# Patient Record
Sex: MALE | Race: OTHER | NOT HISPANIC OR LATINO | URBAN - METROPOLITAN AREA
[De-identification: names, ages, dates, MRNs, and addresses within clinical notes are randomized per-mention and may not be internally consistent; named-entity substitution may affect disease eponyms.]

---

## 2019-05-09 ENCOUNTER — EMERGENCY (EMERGENCY)
Facility: HOSPITAL | Age: 24
LOS: 0 days | Discharge: HOME | End: 2019-05-09
Attending: EMERGENCY MEDICINE | Admitting: EMERGENCY MEDICINE
Payer: COMMERCIAL

## 2019-05-09 VITALS
RESPIRATION RATE: 16 BRPM | HEIGHT: 69 IN | OXYGEN SATURATION: 98 % | HEART RATE: 84 BPM | DIASTOLIC BLOOD PRESSURE: 80 MMHG | SYSTOLIC BLOOD PRESSURE: 128 MMHG | WEIGHT: 289.91 LBS | TEMPERATURE: 99 F

## 2019-05-09 DIAGNOSIS — N50.82 SCROTAL PAIN: ICD-10-CM

## 2019-05-09 LAB
APPEARANCE UR: CLEAR — SIGNIFICANT CHANGE UP
BILIRUB UR-MCNC: NEGATIVE — SIGNIFICANT CHANGE UP
COLOR SPEC: YELLOW — SIGNIFICANT CHANGE UP
DIFF PNL FLD: ABNORMAL
EPI CELLS # UR: ABNORMAL /HPF
GLUCOSE UR QL: NEGATIVE MG/DL — SIGNIFICANT CHANGE UP
KETONES UR-MCNC: ABNORMAL
LEUKOCYTE ESTERASE UR-ACNC: NEGATIVE — SIGNIFICANT CHANGE UP
NITRITE UR-MCNC: NEGATIVE — SIGNIFICANT CHANGE UP
PH UR: 7 — SIGNIFICANT CHANGE UP (ref 5–8)
PROT UR-MCNC: NEGATIVE MG/DL — SIGNIFICANT CHANGE UP
RBC CASTS # UR COMP ASSIST: NEGATIVE — SIGNIFICANT CHANGE UP
SP GR SPEC: 1.02 — SIGNIFICANT CHANGE UP (ref 1.01–1.03)
UROBILINOGEN FLD QL: 0.2 MG/DL — SIGNIFICANT CHANGE UP (ref 0.2–0.2)
WBC UR QL: NEGATIVE — SIGNIFICANT CHANGE UP

## 2019-05-09 PROCEDURE — 76870 US EXAM SCROTUM: CPT | Mod: 26

## 2019-05-09 PROCEDURE — 99284 EMERGENCY DEPT VISIT MOD MDM: CPT

## 2019-05-09 RX ADMIN — Medication 100 MILLIGRAM(S): at 18:29

## 2019-05-09 NOTE — ED PROVIDER NOTE - NSFOLLOWUPINSTRUCTIONS_ED_ALL_ED_FT
take doxycycline 100 mg 2 times a day, F/u with you  center for culture results. See urologist next week

## 2019-05-09 NOTE — ED PROVIDER NOTE - OBJECTIVE STATEMENT
Patient c/o tender swollen testicles for 1 week, See at  given keflex.. No fever, no trouble urinating,. Had unprotected sex 2 weeks ago worried about STD

## 2019-05-09 NOTE — ED ADULT NURSE NOTE - CHIEF COMPLAINT QUOTE
As per patient: 'I am sexually active and my testicles are swollen." pt. is on antibiotics - Cephalexin

## 2019-05-09 NOTE — ED PROVIDER NOTE - CARE PROVIDER_API CALL
Dina Carolina)  Urology  [address] [place] [address], [address]  [place] [address]  Phone: [phone]  Fax: [phone]  Follow Up Time:

## 2019-05-09 NOTE — ED ADULT TRIAGE NOTE - CHIEF COMPLAINT QUOTE
As per patient: 'I am sexually active and my testicles are swollen." As per patient: 'I am sexually active and my testicles are swollen." pt. is on antibiotics

## 2019-05-10 LAB
C TRACH RRNA SPEC QL NAA+PROBE: SIGNIFICANT CHANGE UP
N GONORRHOEA RRNA SPEC QL NAA+PROBE: SIGNIFICANT CHANGE UP
SPECIMEN SOURCE: SIGNIFICANT CHANGE UP

## 2019-05-11 LAB
CULTURE RESULTS: NO GROWTH — SIGNIFICANT CHANGE UP
SPECIMEN SOURCE: SIGNIFICANT CHANGE UP

## 2020-02-11 ENCOUNTER — APPOINTMENT (OUTPATIENT)
Dept: UROLOGY | Facility: CLINIC | Age: 25
End: 2020-02-11

## 2020-10-09 ENCOUNTER — TRANSCRIPTION ENCOUNTER (OUTPATIENT)
Age: 25
End: 2020-10-09

## 2020-10-22 ENCOUNTER — APPOINTMENT (OUTPATIENT)
Dept: OTOLARYNGOLOGY | Facility: CLINIC | Age: 25
End: 2020-10-22
Payer: COMMERCIAL

## 2020-10-22 PROBLEM — Z00.00 ENCOUNTER FOR PREVENTIVE HEALTH EXAMINATION: Status: ACTIVE | Noted: 2020-10-22

## 2020-10-22 PROCEDURE — 99204 OFFICE O/P NEW MOD 45 MIN: CPT | Mod: 25

## 2020-10-22 PROCEDURE — 31231 NASAL ENDOSCOPY DX: CPT

## 2020-10-22 RX ORDER — LEVOCETIRIZINE DIHYDROCHLORIDE 5 MG/1
5 TABLET ORAL DAILY
Qty: 30 | Refills: 3 | Status: ACTIVE | COMMUNITY
Start: 2020-10-22 | End: 1900-01-01

## 2020-10-22 NOTE — HISTORY OF PRESENT ILLNESS
[de-identified] : Patient presents today with c/o sinusitis. Patient states pressure in his face recently. He uses saline rinses on and off. He admits testing positive for COVID 10/9/20/20. Tested negative on Tuesday for COVID. He admits worsened anosmia since then. He admits taste of smell coming back slowly. Pt has seasonal allergies spring and fran. Pt has facial pressure in the forehead and below the eyes. Pt has congestion Pt has loss of smell.

## 2020-11-12 ENCOUNTER — APPOINTMENT (OUTPATIENT)
Dept: OTOLARYNGOLOGY | Facility: CLINIC | Age: 25
End: 2020-11-12
Payer: COMMERCIAL

## 2020-11-12 VITALS — BODY MASS INDEX: 45.91 KG/M2 | WEIGHT: 310 LBS | HEIGHT: 69 IN

## 2020-11-12 PROCEDURE — 99072 ADDL SUPL MATRL&STAF TM PHE: CPT

## 2020-11-12 PROCEDURE — 99212 OFFICE O/P EST SF 10 MIN: CPT | Mod: 25

## 2020-11-12 PROCEDURE — 31231 NASAL ENDOSCOPY DX: CPT

## 2020-11-12 NOTE — HISTORY OF PRESENT ILLNESS
[FreeTextEntry1] : Patient comes in  today with c/o sinusitis. Took antibiotic, steroid, and nasal spray prescribed at last visit. Feels much improved since last visit. Facial pressure much improved. Feels less congested overall. Denies rhinorrhea. Also has seasonal allergies. \par  [Facial Pain] : no facial pain [Headache] : no headache [Clear Rhinorrhea] : no clear rhinorrhea [Facial Pressure] : no facial pressure [Purulent Rhinorrhea] : no purulent rhinorrhea [Nasal Congestion] : no nasal congestion

## 2020-11-12 NOTE — PHYSICAL EXAM
[Nasal Endoscopy Performed] : nasal endoscopy was performed, see procedure section for findings [Normal] : mucosa is normal [Midline] : trachea located in midline position [de-identified] : edema

## 2020-12-02 ENCOUNTER — APPOINTMENT (OUTPATIENT)
Dept: OTOLARYNGOLOGY | Facility: CLINIC | Age: 25
End: 2020-12-02
Payer: COMMERCIAL

## 2020-12-02 VITALS — TEMPERATURE: 98.1 F

## 2020-12-02 PROCEDURE — 31231 NASAL ENDOSCOPY DX: CPT

## 2020-12-02 PROCEDURE — 99072 ADDL SUPL MATRL&STAF TM PHE: CPT

## 2020-12-02 PROCEDURE — 99213 OFFICE O/P EST LOW 20 MIN: CPT | Mod: 25

## 2020-12-02 NOTE — PROCEDURE
[Recalcitrant Symptoms] : recalcitrant symptoms  [None] : none [Flexible Endoscope] : examined with the flexible endoscope [Congested] : congested [Pale] : pale [Normal] : the paranasal sinuses had no abnormalities

## 2020-12-02 NOTE — HISTORY OF PRESENT ILLNESS
[FreeTextEntry1] : Patient presents today following up Sinusitis. Patient has been taking allergy medication and nasal sprays as prescribed. Has recent ran out of nasal spray medication. He denies an recent rhinorrhea.  Smell is improving but still diminished. Pt is breathing well.

## 2020-12-02 NOTE — PHYSICAL EXAM
[Nasal Endoscopy Performed] : nasal endoscopy was performed, see procedure section for findings [Normal] : mucosa is normal [Midline] : trachea located in midline position [de-identified] : edema

## 2021-02-17 ENCOUNTER — APPOINTMENT (OUTPATIENT)
Dept: OTOLARYNGOLOGY | Facility: CLINIC | Age: 26
End: 2021-02-17
Payer: COMMERCIAL

## 2021-02-17 DIAGNOSIS — R43.9 UNSPECIFIED DISTURBANCES OF SMELL AND TASTE: ICD-10-CM

## 2021-02-17 PROCEDURE — 99072 ADDL SUPL MATRL&STAF TM PHE: CPT

## 2021-02-17 PROCEDURE — 31231 NASAL ENDOSCOPY DX: CPT

## 2021-02-17 PROCEDURE — 99213 OFFICE O/P EST LOW 20 MIN: CPT | Mod: 25

## 2021-02-17 NOTE — HISTORY OF PRESENT ILLNESS
[FreeTextEntry1] : Patient returns today following up Sinusitis . He has seen improvement since last visit. Took medications as prescribed on last visit. Takes levocetirizine daily. Pt has no gone to allergist. Pt has some persistent nasal congestion.

## 2021-02-17 NOTE — PHYSICAL EXAM
[Midline] : trachea located in midline position [Normal] : no rashes [] : septum deviated to the left [de-identified] : edema

## 2021-03-09 ENCOUNTER — OUTPATIENT (OUTPATIENT)
Dept: OUTPATIENT SERVICES | Facility: HOSPITAL | Age: 26
LOS: 1 days | Discharge: HOME | End: 2021-03-09
Payer: COMMERCIAL

## 2021-03-09 ENCOUNTER — RESULT REVIEW (OUTPATIENT)
Age: 26
End: 2021-03-09

## 2021-03-09 DIAGNOSIS — J01.90 ACUTE SINUSITIS, UNSPECIFIED: ICD-10-CM

## 2021-03-09 PROCEDURE — 70486 CT MAXILLOFACIAL W/O DYE: CPT | Mod: 26

## 2021-03-10 ENCOUNTER — APPOINTMENT (OUTPATIENT)
Dept: OTOLARYNGOLOGY | Facility: CLINIC | Age: 26
End: 2021-03-10
Payer: COMMERCIAL

## 2021-03-10 DIAGNOSIS — J01.90 ACUTE SINUSITIS, UNSPECIFIED: ICD-10-CM

## 2021-03-10 PROCEDURE — 31231 NASAL ENDOSCOPY DX: CPT

## 2021-03-10 PROCEDURE — 99213 OFFICE O/P EST LOW 20 MIN: CPT | Mod: 25

## 2021-03-10 PROCEDURE — 99072 ADDL SUPL MATRL&STAF TM PHE: CPT

## 2021-03-10 NOTE — ASSESSMENT
[FreeTextEntry1] : Risks, benefits, and alternatives of nasal ablation and  turbinate reduction were explained including but not limited to bleeding, infection, persistent symptoms, numbness, perforation, change in smell, change in taste, need for additional surgery, etc...\par

## 2021-03-10 NOTE — REASON FOR VISIT
[Subsequent Evaluation] : a subsequent evaluation for [FreeTextEntry2] : allergic rhinitis, hypertrophy of nasal turbinates

## 2021-03-10 NOTE — PROCEDURE
[Recalcitrant Symptoms] : recalcitrant symptoms  [None] : none [Flexible Endoscope] : examined with the flexible endoscope [Congested] : congested [Deviated to the Rt] : deviated to the right [Normal] : the nasopharynx was normal

## 2021-05-06 ENCOUNTER — APPOINTMENT (OUTPATIENT)
Dept: OTOLARYNGOLOGY | Facility: CLINIC | Age: 26
End: 2021-05-06

## 2021-07-21 ENCOUNTER — APPOINTMENT (OUTPATIENT)
Dept: OTOLARYNGOLOGY | Facility: CLINIC | Age: 26
End: 2021-07-21

## 2021-09-09 ENCOUNTER — APPOINTMENT (OUTPATIENT)
Dept: OTOLARYNGOLOGY | Facility: CLINIC | Age: 26
End: 2021-09-09

## 2021-10-20 ENCOUNTER — APPOINTMENT (OUTPATIENT)
Dept: OTOLARYNGOLOGY | Facility: CLINIC | Age: 26
End: 2021-10-20
Payer: COMMERCIAL

## 2021-10-20 DIAGNOSIS — R22.0 LOCALIZED SWELLING, MASS AND LUMP, HEAD: ICD-10-CM

## 2021-10-20 DIAGNOSIS — J34.3 HYPERTROPHY OF NASAL TURBINATES: ICD-10-CM

## 2021-10-20 PROCEDURE — 31231 NASAL ENDOSCOPY DX: CPT

## 2021-10-20 PROCEDURE — 99214 OFFICE O/P EST MOD 30 MIN: CPT | Mod: 25

## 2021-10-20 RX ORDER — PANTOPRAZOLE 40 MG/1
40 TABLET, DELAYED RELEASE ORAL
Qty: 30 | Refills: 3 | Status: ACTIVE | COMMUNITY
Start: 2021-10-20 | End: 1900-01-01

## 2021-10-20 NOTE — ASSESSMENT
[FreeTextEntry1] : Risks, benefits, and alternatives of turbinate reduction were explained including but not limited to bleeding, infection, persistent symptoms, numbness, perforation, change in smell, change in taste, need for additional surgery, etc...\par

## 2021-10-20 NOTE — PHYSICAL EXAM
[Nasal Endoscopy Performed] : nasal endoscopy was performed, see procedure section for findings [Midline] : trachea located in midline position [Normal] : no abnormal secretions [de-identified] : edema

## 2021-10-20 NOTE — HISTORY OF PRESENT ILLNESS
[FreeTextEntry1] : Patient returns today following up on  allergic rhinitis.  He is  congested . Has seen allergist  suggested allergy shots .\par Would like a second opinion.    He is on azelastine/ Flonase   , and takes Allegra  IVANIA .

## 2022-08-15 ENCOUNTER — APPOINTMENT (OUTPATIENT)
Dept: CARDIOLOGY | Facility: CLINIC | Age: 27
End: 2022-08-15

## 2022-08-15 VITALS
WEIGHT: 301 LBS | BODY MASS INDEX: 44.58 KG/M2 | DIASTOLIC BLOOD PRESSURE: 80 MMHG | HEIGHT: 69 IN | SYSTOLIC BLOOD PRESSURE: 120 MMHG

## 2022-08-15 DIAGNOSIS — U07.1 COVID-19: ICD-10-CM

## 2022-08-15 DIAGNOSIS — Z87.891 PERSONAL HISTORY OF NICOTINE DEPENDENCE: ICD-10-CM

## 2022-08-15 DIAGNOSIS — J30.9 ALLERGIC RHINITIS, UNSPECIFIED: ICD-10-CM

## 2022-08-15 DIAGNOSIS — R06.02 SHORTNESS OF BREATH: ICD-10-CM

## 2022-08-15 PROCEDURE — 93000 ELECTROCARDIOGRAM COMPLETE: CPT

## 2022-08-15 PROCEDURE — 99203 OFFICE O/P NEW LOW 30 MIN: CPT | Mod: 25

## 2022-08-15 PROCEDURE — 93306 TTE W/DOPPLER COMPLETE: CPT

## 2022-08-15 RX ORDER — DOXYCYCLINE HYCLATE 100 MG/1
100 CAPSULE ORAL DAILY
Qty: 14 | Refills: 0 | Status: DISCONTINUED | COMMUNITY
Start: 2020-10-22 | End: 2022-08-15

## 2022-08-15 RX ORDER — MONTELUKAST 10 MG/1
10 TABLET, FILM COATED ORAL
Qty: 90 | Refills: 0 | Status: ACTIVE | COMMUNITY
Start: 2022-04-18

## 2022-08-15 RX ORDER — LEVOCETIRIZINE DIHYDROCHLORIDE 5 MG/1
5 TABLET ORAL
Qty: 30 | Refills: 3 | Status: DISCONTINUED | COMMUNITY
Start: 2020-11-12 | End: 2022-08-15

## 2022-08-15 RX ORDER — METHYLPREDNISOLONE 4 MG/1
4 TABLET ORAL
Qty: 1 | Refills: 0 | Status: DISCONTINUED | COMMUNITY
Start: 2020-10-22 | End: 2022-08-15

## 2022-08-15 RX ORDER — AZELASTINE HYDROCHLORIDE AND FLUTICASONE PROPIONATE 137; 50 UG/1; UG/1
137-50 SPRAY, METERED NASAL
Qty: 69 | Refills: 0 | Status: ACTIVE | COMMUNITY
Start: 2022-05-09

## 2022-08-15 RX ORDER — LEVOCETIRIZINE DIHYDROCHLORIDE 5 MG/1
5 TABLET ORAL DAILY
Qty: 30 | Refills: 2 | Status: COMPLETED | COMMUNITY
Start: 2021-02-17 | End: 2022-08-15

## 2022-08-15 NOTE — HISTORY OF PRESENT ILLNESS
[FreeTextEntry1] : Mr. Proctor is a 26yo M with PMHx of allergic rhinitis, COVID-19 who presents to [facility] care. His PMD is Dr. Laurie Rocha. He has been complaining of SOB. He says it occurs at rest and feels like he is not "getting enough air". He will take a deep breath and will start to feel better. He denies associated exertional symptoms or CP, palpitations. He admits to further stress in his life and notices when he is busy his symptoms are better. He has had COVID twice in the last 2 years, both mild without pulmonary symptoms. He is currently dieting and trying to lose weight. He also admits to former vaping, and will only occasionally vape with friends.

## 2022-08-15 NOTE — REVIEW OF SYSTEMS
[SOB] : shortness of breath [Dyspnea on exertion] : not dyspnea during exertion [Chest Discomfort] : no chest discomfort [Orthopnea] : no orthopnea [Negative] : Heme/Lymph

## 2022-08-15 NOTE — REASON FOR VISIT
[Other: ____] : [unfilled] [FreeTextEntry1] : Diagnostic Tests:\par --------------------\par EKG:\par 08/15/22-NSR. \par --------------------\par Echo:\par 08/15/22-TTE: EF 60%. Normal TTE.

## 2022-08-15 NOTE — ASSESSMENT
[FreeTextEntry1] : SOB: Patient with SOB at rest, which appears to be associated with anxiety. EKG and TTE normal. \par -Patient wishes to wait on labs until he loses more weight.\par -Check CXR. \par -May be component of anxiety.\par -If symptoms persist or worsen, will consider stress testing. \par \par Allergic rhinitis: \par -Continue with current therapy.\par -Discussed with patient about not using phenylephrine/pseudophedrine (D) for more than 4-5 days to reduce risk of rhinitis medicamentosa. \par \par Follow up in 3 months.

## 2022-08-15 NOTE — PHYSICAL EXAM
[Well Developed] : well developed [Well Nourished] : well nourished [No Acute Distress] : no acute distress [Normal Venous Pressure] : normal venous pressure [5th Left ICS - MCL] : palpated at the 5th LICS in the midclavicular line [Normal] : normal [No Precordial Heave] : no precordial heave was noted [Normal Rate] : normal [Rhythm Regular] : regular [Normal S1] : normal S1 [Normal S2] : normal S2 [No Murmur] : no murmurs heard [No Pitting Edema] : no pitting edema present [2+] : left 2+ [Clear Lung Fields] : clear lung fields [Good Air Entry] : good air entry [No Respiratory Distress] : no respiratory distress  [Soft] : abdomen soft [Non Tender] : non-tender [Normal Bowel Sounds] : normal bowel sounds [Normal Gait] : normal gait [No Edema] : no edema [No Varicosities] : no varicosities [No Rash] : no rash [Moves all extremities] : moves all extremities [No Focal Deficits] : no focal deficits [Alert and Oriented] : alert and oriented

## 2022-11-11 NOTE — HISTORY OF PRESENT ILLNESS
[FreeTextEntry1] : Mr. Proctor is a 26yo M with PMHx of allergic rhinitis, COVID-19 who presents to [facility] care. His PMD is Dr. Laurie Rocha. He has been complaining of SOB. He says it occurs at rest and feels like he is not "getting enough air". He will take a deep breath and will start to feel better. He denies associated exertional symptoms or CP, palpitations. He admits to further stress in his life and notices when he is busy his symptoms are better. He has had COVID twice in the last 2 years, both mild without pulmonary symptoms. He is currently dieting and trying to lose weight. He also admits to former vaping, and will only occasionally vape with friends.

## 2022-11-11 NOTE — PHYSICAL EXAM
[Well Developed] : well developed [Well Nourished] : well nourished [No Acute Distress] : no acute distress [Normal Venous Pressure] : normal venous pressure [5th Left ICS - MCL] : palpated at the 5th LICS in the midclavicular line [Normal] : normal [No Precordial Heave] : no precordial heave was noted [Normal Rate] : normal [Rhythm Regular] : regular [Normal S1] : normal S1 [Normal S2] : normal S2 [No Murmur] : no murmurs heard [No Pitting Edema] : no pitting edema present [2+] : left 2+ [Clear Lung Fields] : clear lung fields [No Respiratory Distress] : no respiratory distress  [Good Air Entry] : good air entry [Soft] : abdomen soft [Non Tender] : non-tender [Normal Bowel Sounds] : normal bowel sounds [Normal Gait] : normal gait [No Edema] : no edema [No Varicosities] : no varicosities [No Rash] : no rash [Moves all extremities] : moves all extremities [No Focal Deficits] : no focal deficits [Alert and Oriented] : alert and oriented

## 2022-11-15 ENCOUNTER — APPOINTMENT (OUTPATIENT)
Dept: CARDIOLOGY | Facility: CLINIC | Age: 27
End: 2022-11-15

## 2023-08-11 NOTE — HISTORY OF PRESENT ILLNESS
[FreeTextEntry1] : Patient presents today following up on allergic rhinitis, hypertrophy of nasal turbinates. Patient admits his deviated septum has been bothering him. Patient having nasal congestion. Patient thinks gets worse during allergy months. Snoring at night. Patient had CT scan yesterday- no report yet.  [Normal] : mucosa is normal [Midline] : trachea located in midline position

## 2023-11-18 ENCOUNTER — EMERGENCY (EMERGENCY)
Facility: HOSPITAL | Age: 28
LOS: 0 days | Discharge: HOME | End: 2023-11-19
Attending: STUDENT IN AN ORGANIZED HEALTH CARE EDUCATION/TRAINING PROGRAM
Payer: COMMERCIAL

## 2023-11-18 VITALS
WEIGHT: 300.05 LBS | HEIGHT: 68 IN | RESPIRATION RATE: 18 BRPM | SYSTOLIC BLOOD PRESSURE: 129 MMHG | DIASTOLIC BLOOD PRESSURE: 78 MMHG | TEMPERATURE: 99 F | HEART RATE: 114 BPM | OXYGEN SATURATION: 98 %

## 2023-11-18 DIAGNOSIS — F17.290 NICOTINE DEPENDENCE, OTHER TOBACCO PRODUCT, UNCOMPLICATED: ICD-10-CM

## 2023-11-18 DIAGNOSIS — L02.211 CUTANEOUS ABSCESS OF ABDOMINAL WALL: ICD-10-CM

## 2023-11-18 DIAGNOSIS — R21 RASH AND OTHER NONSPECIFIC SKIN ERUPTION: ICD-10-CM

## 2023-11-18 LAB
ALBUMIN SERPL ELPH-MCNC: 4.4 G/DL — SIGNIFICANT CHANGE UP (ref 3.5–5.2)
ALBUMIN SERPL ELPH-MCNC: 4.4 G/DL — SIGNIFICANT CHANGE UP (ref 3.5–5.2)
ALP SERPL-CCNC: 77 U/L — SIGNIFICANT CHANGE UP (ref 30–115)
ALP SERPL-CCNC: 77 U/L — SIGNIFICANT CHANGE UP (ref 30–115)
ALT FLD-CCNC: 18 U/L — SIGNIFICANT CHANGE UP (ref 0–41)
ALT FLD-CCNC: 18 U/L — SIGNIFICANT CHANGE UP (ref 0–41)
ANION GAP SERPL CALC-SCNC: 11 MMOL/L — SIGNIFICANT CHANGE UP (ref 7–14)
ANION GAP SERPL CALC-SCNC: 11 MMOL/L — SIGNIFICANT CHANGE UP (ref 7–14)
AST SERPL-CCNC: 20 U/L — SIGNIFICANT CHANGE UP (ref 0–41)
AST SERPL-CCNC: 20 U/L — SIGNIFICANT CHANGE UP (ref 0–41)
BASOPHILS # BLD AUTO: 0.05 K/UL — SIGNIFICANT CHANGE UP (ref 0–0.2)
BASOPHILS # BLD AUTO: 0.05 K/UL — SIGNIFICANT CHANGE UP (ref 0–0.2)
BASOPHILS NFR BLD AUTO: 0.3 % — SIGNIFICANT CHANGE UP (ref 0–1)
BASOPHILS NFR BLD AUTO: 0.3 % — SIGNIFICANT CHANGE UP (ref 0–1)
BILIRUB SERPL-MCNC: 0.7 MG/DL — SIGNIFICANT CHANGE UP (ref 0.2–1.2)
BILIRUB SERPL-MCNC: 0.7 MG/DL — SIGNIFICANT CHANGE UP (ref 0.2–1.2)
BUN SERPL-MCNC: 12 MG/DL — SIGNIFICANT CHANGE UP (ref 10–20)
BUN SERPL-MCNC: 12 MG/DL — SIGNIFICANT CHANGE UP (ref 10–20)
CALCIUM SERPL-MCNC: 9.5 MG/DL — SIGNIFICANT CHANGE UP (ref 8.4–10.5)
CALCIUM SERPL-MCNC: 9.5 MG/DL — SIGNIFICANT CHANGE UP (ref 8.4–10.5)
CHLORIDE SERPL-SCNC: 100 MMOL/L — SIGNIFICANT CHANGE UP (ref 98–110)
CHLORIDE SERPL-SCNC: 100 MMOL/L — SIGNIFICANT CHANGE UP (ref 98–110)
CO2 SERPL-SCNC: 27 MMOL/L — SIGNIFICANT CHANGE UP (ref 17–32)
CO2 SERPL-SCNC: 27 MMOL/L — SIGNIFICANT CHANGE UP (ref 17–32)
CREAT SERPL-MCNC: 0.9 MG/DL — SIGNIFICANT CHANGE UP (ref 0.7–1.5)
CREAT SERPL-MCNC: 0.9 MG/DL — SIGNIFICANT CHANGE UP (ref 0.7–1.5)
EGFR: 119 ML/MIN/1.73M2 — SIGNIFICANT CHANGE UP
EGFR: 119 ML/MIN/1.73M2 — SIGNIFICANT CHANGE UP
EOSINOPHIL # BLD AUTO: 0.03 K/UL — SIGNIFICANT CHANGE UP (ref 0–0.7)
EOSINOPHIL # BLD AUTO: 0.03 K/UL — SIGNIFICANT CHANGE UP (ref 0–0.7)
EOSINOPHIL NFR BLD AUTO: 0.2 % — SIGNIFICANT CHANGE UP (ref 0–8)
EOSINOPHIL NFR BLD AUTO: 0.2 % — SIGNIFICANT CHANGE UP (ref 0–8)
GLUCOSE SERPL-MCNC: 87 MG/DL — SIGNIFICANT CHANGE UP (ref 70–99)
GLUCOSE SERPL-MCNC: 87 MG/DL — SIGNIFICANT CHANGE UP (ref 70–99)
HCT VFR BLD CALC: 40.3 % — LOW (ref 42–52)
HCT VFR BLD CALC: 40.3 % — LOW (ref 42–52)
HGB BLD-MCNC: 13.9 G/DL — LOW (ref 14–18)
HGB BLD-MCNC: 13.9 G/DL — LOW (ref 14–18)
IMM GRANULOCYTES NFR BLD AUTO: 0.3 % — SIGNIFICANT CHANGE UP (ref 0.1–0.3)
IMM GRANULOCYTES NFR BLD AUTO: 0.3 % — SIGNIFICANT CHANGE UP (ref 0.1–0.3)
LACTATE SERPL-SCNC: 0.8 MMOL/L — SIGNIFICANT CHANGE UP (ref 0.7–2)
LACTATE SERPL-SCNC: 0.8 MMOL/L — SIGNIFICANT CHANGE UP (ref 0.7–2)
LYMPHOCYTES # BLD AUTO: 25 % — SIGNIFICANT CHANGE UP (ref 20.5–51.1)
LYMPHOCYTES # BLD AUTO: 25 % — SIGNIFICANT CHANGE UP (ref 20.5–51.1)
LYMPHOCYTES # BLD AUTO: 3.95 K/UL — HIGH (ref 1.2–3.4)
LYMPHOCYTES # BLD AUTO: 3.95 K/UL — HIGH (ref 1.2–3.4)
MCHC RBC-ENTMCNC: 30.8 PG — SIGNIFICANT CHANGE UP (ref 27–31)
MCHC RBC-ENTMCNC: 30.8 PG — SIGNIFICANT CHANGE UP (ref 27–31)
MCHC RBC-ENTMCNC: 34.5 G/DL — SIGNIFICANT CHANGE UP (ref 32–37)
MCHC RBC-ENTMCNC: 34.5 G/DL — SIGNIFICANT CHANGE UP (ref 32–37)
MCV RBC AUTO: 89.2 FL — SIGNIFICANT CHANGE UP (ref 80–94)
MCV RBC AUTO: 89.2 FL — SIGNIFICANT CHANGE UP (ref 80–94)
MONOCYTES # BLD AUTO: 1.16 K/UL — HIGH (ref 0.1–0.6)
MONOCYTES # BLD AUTO: 1.16 K/UL — HIGH (ref 0.1–0.6)
MONOCYTES NFR BLD AUTO: 7.3 % — SIGNIFICANT CHANGE UP (ref 1.7–9.3)
MONOCYTES NFR BLD AUTO: 7.3 % — SIGNIFICANT CHANGE UP (ref 1.7–9.3)
NEUTROPHILS # BLD AUTO: 10.57 K/UL — HIGH (ref 1.4–6.5)
NEUTROPHILS # BLD AUTO: 10.57 K/UL — HIGH (ref 1.4–6.5)
NEUTROPHILS NFR BLD AUTO: 66.9 % — SIGNIFICANT CHANGE UP (ref 42.2–75.2)
NEUTROPHILS NFR BLD AUTO: 66.9 % — SIGNIFICANT CHANGE UP (ref 42.2–75.2)
NRBC # BLD: 0 /100 WBCS — SIGNIFICANT CHANGE UP (ref 0–0)
NRBC # BLD: 0 /100 WBCS — SIGNIFICANT CHANGE UP (ref 0–0)
PLATELET # BLD AUTO: 291 K/UL — SIGNIFICANT CHANGE UP (ref 130–400)
PLATELET # BLD AUTO: 291 K/UL — SIGNIFICANT CHANGE UP (ref 130–400)
PMV BLD: 10.3 FL — SIGNIFICANT CHANGE UP (ref 7.4–10.4)
PMV BLD: 10.3 FL — SIGNIFICANT CHANGE UP (ref 7.4–10.4)
POTASSIUM SERPL-MCNC: 3.8 MMOL/L — SIGNIFICANT CHANGE UP (ref 3.5–5)
POTASSIUM SERPL-MCNC: 3.8 MMOL/L — SIGNIFICANT CHANGE UP (ref 3.5–5)
POTASSIUM SERPL-SCNC: 3.8 MMOL/L — SIGNIFICANT CHANGE UP (ref 3.5–5)
POTASSIUM SERPL-SCNC: 3.8 MMOL/L — SIGNIFICANT CHANGE UP (ref 3.5–5)
PROT SERPL-MCNC: 7.2 G/DL — SIGNIFICANT CHANGE UP (ref 6–8)
PROT SERPL-MCNC: 7.2 G/DL — SIGNIFICANT CHANGE UP (ref 6–8)
RBC # BLD: 4.52 M/UL — LOW (ref 4.7–6.1)
RBC # BLD: 4.52 M/UL — LOW (ref 4.7–6.1)
RBC # FLD: 12 % — SIGNIFICANT CHANGE UP (ref 11.5–14.5)
RBC # FLD: 12 % — SIGNIFICANT CHANGE UP (ref 11.5–14.5)
SODIUM SERPL-SCNC: 138 MMOL/L — SIGNIFICANT CHANGE UP (ref 135–146)
SODIUM SERPL-SCNC: 138 MMOL/L — SIGNIFICANT CHANGE UP (ref 135–146)
WBC # BLD: 15.8 K/UL — HIGH (ref 4.8–10.8)
WBC # BLD: 15.8 K/UL — HIGH (ref 4.8–10.8)
WBC # FLD AUTO: 15.8 K/UL — HIGH (ref 4.8–10.8)
WBC # FLD AUTO: 15.8 K/UL — HIGH (ref 4.8–10.8)

## 2023-11-18 PROCEDURE — 74177 CT ABD & PELVIS W/CONTRAST: CPT | Mod: MA

## 2023-11-18 PROCEDURE — 83605 ASSAY OF LACTIC ACID: CPT

## 2023-11-18 PROCEDURE — 99285 EMERGENCY DEPT VISIT HI MDM: CPT

## 2023-11-18 PROCEDURE — 85025 COMPLETE CBC W/AUTO DIFF WBC: CPT

## 2023-11-18 PROCEDURE — 96375 TX/PRO/DX INJ NEW DRUG ADDON: CPT

## 2023-11-18 PROCEDURE — 80053 COMPREHEN METABOLIC PANEL: CPT

## 2023-11-18 PROCEDURE — 36415 COLL VENOUS BLD VENIPUNCTURE: CPT

## 2023-11-18 PROCEDURE — 99284 EMERGENCY DEPT VISIT MOD MDM: CPT | Mod: 25

## 2023-11-18 PROCEDURE — 96361 HYDRATE IV INFUSION ADD-ON: CPT

## 2023-11-18 PROCEDURE — 74177 CT ABD & PELVIS W/CONTRAST: CPT | Mod: 26,MA

## 2023-11-18 PROCEDURE — 96365 THER/PROPH/DIAG IV INF INIT: CPT | Mod: XU

## 2023-11-18 RX ORDER — KETOROLAC TROMETHAMINE 30 MG/ML
30 SYRINGE (ML) INJECTION ONCE
Refills: 0 | Status: DISCONTINUED | OUTPATIENT
Start: 2023-11-18 | End: 2023-11-18

## 2023-11-18 RX ORDER — CEFAZOLIN SODIUM 1 G
2000 VIAL (EA) INJECTION ONCE
Refills: 0 | Status: COMPLETED | OUTPATIENT
Start: 2023-11-18 | End: 2023-11-18

## 2023-11-18 RX ORDER — SODIUM CHLORIDE 9 MG/ML
1000 INJECTION INTRAMUSCULAR; INTRAVENOUS; SUBCUTANEOUS ONCE
Refills: 0 | Status: COMPLETED | OUTPATIENT
Start: 2023-11-18 | End: 2023-11-18

## 2023-11-18 RX ORDER — VANCOMYCIN HCL 1 G
1000 VIAL (EA) INTRAVENOUS ONCE
Refills: 0 | Status: COMPLETED | OUTPATIENT
Start: 2023-11-18 | End: 2023-11-18

## 2023-11-18 RX ADMIN — SODIUM CHLORIDE 1000 MILLILITER(S): 9 INJECTION INTRAMUSCULAR; INTRAVENOUS; SUBCUTANEOUS at 22:31

## 2023-11-18 RX ADMIN — Medication 30 MILLIGRAM(S): at 22:32

## 2023-11-18 NOTE — ED ADULT TRIAGE NOTE - CHIEF COMPLAINT QUOTE
Patient complains of cellulitis that is increasing in redness and drainage since seen by [facility] and placed on ABX

## 2023-11-19 VITALS
TEMPERATURE: 98 F | WEIGHT: 300.05 LBS | SYSTOLIC BLOOD PRESSURE: 133 MMHG | RESPIRATION RATE: 18 BRPM | HEART RATE: 90 BPM | HEIGHT: 68 IN | DIASTOLIC BLOOD PRESSURE: 71 MMHG | OXYGEN SATURATION: 97 %

## 2023-11-19 VITALS
TEMPERATURE: 98 F | DIASTOLIC BLOOD PRESSURE: 62 MMHG | OXYGEN SATURATION: 98 % | RESPIRATION RATE: 18 BRPM | SYSTOLIC BLOOD PRESSURE: 106 MMHG | HEART RATE: 82 BPM

## 2023-11-19 DIAGNOSIS — L02.211 CUTANEOUS ABSCESS OF ABDOMINAL WALL: ICD-10-CM

## 2023-11-19 DIAGNOSIS — F17.210 NICOTINE DEPENDENCE, CIGARETTES, UNCOMPLICATED: ICD-10-CM

## 2023-11-19 DIAGNOSIS — L03.311 CELLULITIS OF ABDOMINAL WALL: ICD-10-CM

## 2023-11-19 DIAGNOSIS — Z48.01 ENCOUNTER FOR CHANGE OR REMOVAL OF SURGICAL WOUND DRESSING: ICD-10-CM

## 2023-11-19 PROCEDURE — 99212 OFFICE O/P EST SF 10 MIN: CPT

## 2023-11-19 PROCEDURE — 99284 EMERGENCY DEPT VISIT MOD MDM: CPT | Mod: 25

## 2023-11-19 PROCEDURE — L9995: CPT

## 2023-11-19 PROCEDURE — 10060 I&D ABSCESS SIMPLE/SINGLE: CPT

## 2023-11-19 RX ADMIN — Medication 30 MILLIGRAM(S): at 00:27

## 2023-11-19 RX ADMIN — Medication 2000 MILLIGRAM(S): at 01:00

## 2023-11-19 RX ADMIN — Medication 250 MILLIGRAM(S): at 01:06

## 2023-11-19 RX ADMIN — Medication 100 MILLIGRAM(S): at 00:30

## 2023-11-19 RX ADMIN — SODIUM CHLORIDE 1000 MILLILITER(S): 9 INJECTION INTRAMUSCULAR; INTRAVENOUS; SUBCUTANEOUS at 00:27

## 2023-11-19 NOTE — ED PROVIDER NOTE - PATIENT PORTAL LINK FT
You can access the FollowMyHealth Patient Portal offered by [facility] by registering at the following website: [email or web address]. By joining [facility]’s FollowMyHealth portal, you will also be able to view your health information using other applications (apps) compatible with our system.

## 2023-11-19 NOTE — ED PROVIDER NOTE - PHYSICAL EXAMINATION
Physical Exam    Vital Signs: I have reviewed the initial vital signs.  Constitutional: appears stated age, no acute distress  Eyes:Sclera clear, EOMI.  Cardiovascular: S1 and S2, regular rate, regular rhythm, well-perfused extremities, radial pulses equal and 2+, pedal pulses 2+ and equal  Respiratory: unlabored respiratory effort, clear to auscultation bilaterally no wheezing, rales, or rhonchi  Gastrointestinal:  abdomen soft, mild periumbilical tenderness to palpation  Musculoskeletal: supple neck, no lower extremity edema  Integumentary: warm, dry, periumbilical erythema with purulent discharge from umbilicus  Neurologic: awake, alert, oriented x3, extremities’ motor and sensory functions grossly intact

## 2023-11-19 NOTE — ED PROVIDER NOTE - PATIENT PORTAL LINK FT
You can access the FollowMyHealth Patient Portal offered by [facility] by registering at the following website: [email or web address]. By joining [facility]’s FollowMyHealth portal, you will also be able to view your health information using other applications (apps) compatible with our system.

## 2023-11-19 NOTE — ED PROVIDER NOTE - PROGRESS NOTE DETAILS
AY: surgery consulted for wound packing change. AY: Wound packing changed by surgery PA. Patient instructed to follow-up with Dr. Lakhani on Tuesday/Wednesday this week.     The patient was given detailed return precautions and advised to return to the emergency department if any new symptoms developed, symptoms worsened or for any concerns. The patient was offered the opportunity to ask questions and verbalized that they understand the diagnosis and discharge instructions. No

## 2023-11-19 NOTE — ED PROVIDER NOTE - NSFOLLOWUPINSTRUCTIONS_ED_ALL_ED_FT
Follow-up with Dr. Lakhani on 11/21 or 11/22.    Abscess    An abscess is an infected area that contains a collection of pus and debris. It can occur in almost any part of the body and occurs when the tissue gets infection. Symptoms include a painful mass that is red, warm, tender that might break open and HAVE drainage. If your health care provider gave you antibiotics make sure to take the full course and do not stop even if feeling better.     SEEK IMMEDIATE MEDICAL CARE IF YOU HAVE ANY OF THE FOLLOWING SYMPTOMS: chills, fever, muscle aches, or red streaking from the area.

## 2023-11-19 NOTE — ED PROVIDER NOTE - NS ED ATTENDING STATEMENT MOD
Attending with This was a shared visit with the ELICEO. I reviewed and verified the documentation and independently performed the documented:

## 2023-11-19 NOTE — ED PROVIDER NOTE - NSPTACCESSSVCSAPPTDETAILS_ED_ALL_ED_FT
Patient with abdominal abscess, wound packed today, needs appointment with Dr. Lakhani on 11/21 or 11/22

## 2023-11-19 NOTE — ED PROVIDER NOTE - NSFOLLOWUPINSTRUCTIONS_ED_ALL_ED_FT
- return to emergency department within 24 hours for wound check/packing replacement  - take 450 mg clindamycin every 8 hours for the next 7 days  - follow-up with Dr. Lakhani within the next week    Abscess    An abscess is an infected area that contains a collection of pus and debris. It can occur in almost any part of the body and occurs when the tissue gets infection. Symptoms include a painful mass that is red, warm, tender that might break open and HAVE drainage. If your health care provider gave you antibiotics make sure to take the full course and do not stop even if feeling better.     SEEK IMMEDIATE MEDICAL CARE IF YOU HAVE ANY OF THE FOLLOWING SYMPTOMS: chills, fever, muscle aches, or red streaking from the area.

## 2023-11-19 NOTE — ED PROVIDER NOTE - CARE PROVIDER_API CALL
Pablo Lakhani  Surgery  [address] [place] [address], [address]  [place] [address]  Phone: [phone]  Fax: [phone]  Follow Up Time: 4-6 Days

## 2023-11-19 NOTE — ED PROVIDER NOTE - ATTENDING APP SHARED VISIT CONTRIBUTION OF CARE
27 yo m no pmh  pt presents for eval of abd wall redness. sx noticed 2 days ago. pt went to  yesterday and was started on keflex. pt s/p 1 day of keflex but redness increased and pt developed purulent discharge today. no fevers. no trauma. no known bug bite.      vss  gen- NAD, aaox3  card-rrr  lungs-ctab, no wheezing or rhonchi  abd-sntnd, no guarding or rebound  neuro- full str/sensation, cn ii-xii grossly intact, normal coordination and gait  skin- ~12 inches of abd wall redness, no crepitus, grey purulent drainage from umbilicus, no pain out of proportion

## 2023-11-19 NOTE — ED PROVIDER NOTE - OBJECTIVE STATEMENT
28-year-old male denies significant past medical history, seen in ED on 11/18 for abscess presents for wound packing change.  Denies fever/chills, lightheadedness, dizziness, numbness/tingling, chest pain, shortness of breath, nausea/vomiting/diarrhea, change in bowel/bladder habits.

## 2023-11-19 NOTE — ED PROVIDER NOTE - NS ED ATTENDING STATEMENT MOD
This was a shared visit with the ELICEO. I reviewed and verified the documentation and independently performed the documented:

## 2023-11-19 NOTE — CONSULT NOTE ADULT - SUBJECTIVE AND OBJECTIVE BOX
27 yo m no pmh  pt presents for eval of abd wall redness. sx noticed 2 days ago. pt went to  yesterday and was started on keflex. pt s/p 1 day of keflex but redness increased and pt developed purulent discharge today. no fevers. no trauma. no known bug bite.             · Chief Complaint: The patient is a 28y Male complaining of wound check.  27 yo m no pmh, no prior  surgery,   presents for eval of abd wall redness. sx noticed 2 days ago. pt went to  yesterday and was started on keflex. pt s/p 1 day of keflex but redness increased and pt developed purulent discharge today. no fevers. no trauma. no known bug bite.            PAST MEDICAL/SURGICAL/FAMILY/SOCIAL HISTORY:    Past Medical, Past Surgical, and Family History:  HIV:    HIV Test Questions:  · In accordance with [place] State law, we offer every patient who comes to our ED an HIV test. Would you like to be tested today?	Opt out  PAST MEDICAL HISTORY:  No pertinent past medical history.     PAST SURGICAL HISTORY:  No significant past surgical history.    ALLERGIES AND HOME MEDICATIONS:   Allergies:        Allergies:  	No Known Allergies:     Home Medications:   * Patient Currently Takes Medications as of 09-May-2019 18:08 documented in Structured Notes  · 	doxycycline hyclate 100 mg oral capsule: 1 cap(s) orally 2 times a day     CURRENT ORDERS/:   · 	ceFAZolin   IVPB, [Ordered as ANCEF   IVPB]  	2000 milliGRAM(s) in IV Solution 50 milliLiter(s), IV Intermittent, once, infuse over 30 Minute(s), Stop After 1 Doses  	Indication: abscess  	Administration Instructions: This is a Look-alike/Sound-alike Medication., 18-Nov-2023, Active, 18-Nov-2023, Standard  · 	vancomycin  IVPB., 1000 milliGRAM(s) in dextrose 5% 250 milliLiter(s), IV Intermittent, once, infuse over 60 Minute(s), Stop After 1 Doses  	Indication: abscess, 18-Nov-2023, Active, 18-Nov-2023, Standard   	IV Insert,   Time/Priority:  STAT, 18-Nov-2023, Active, Standard    Vital Signs Last 24 Hrs  T(C): 37.3 (18 Nov 2023 21:46), Max: 37.3 (18 Nov 2023 21:46)  T(F): 99.2 (18 Nov 2023 21:46), Max: 99.2 (18 Nov 2023 21:46)  HR: 114 (18 Nov 2023 21:46) (114 - 114)  BP: 129/78 (18 Nov 2023 21:46) (129/78 - 129/78)  BP(mean): --  RR: 18 (18 Nov 2023 21:46) (18 - 18)  SpO2: 98% (18 Nov 2023 21:46) (98% - 98%)    Parameters below as of 18 Nov 2023 21:46  Patient On (Oxygen Delivery Method): room air    PE  OBESE  MALE  HEENT  NCAT  PULM  CLEAR  CVS  S1S2  ABD OBESE +TENDER, + SWELLING, + DRAINING PUS FROM UMBILICUS  NEURO AXOX3  EXT NO EDEMA                        13.9   15.80 )-----------( 291      ( 18 Nov 2023 22:25 )             40.3   11-18    138  |  100  |  12  ----------------------------<  87  3.8   |  27  |  0.9    Ca    9.5      18 Nov 2023 22:25    TPro  7.2  /  Alb  4.4  /  TBili  0.7  /  DBili  x   /  AST  20  /  ALT  18  /  AlkPhos  77  11-18  ACC: [id number] EXAM:  CT ABDOMEN AND PELVIS IC   ORDERED BY: WILLIS HADLEY     PROCEDURE DATE:  11/18/2023          INTERPRETATION:  CLINICAL HISTORY / REASON FOR EXAM: Abdominal   wall/periumbilical cellulitis with drainage, evaluation for abscess    TECHNIQUE: Contiguous axial CT images were obtained from the lower chest   to the pubic symphysis following the administration of 100 mL Omnipaque   350 intravenous contrast . Oral contrast was not administered. Coronal   and sagittal reformats were submitted for review.    COMPARISON CT: None.    FINDINGS:    LOWER CHEST: Small nodules noted in the partially imaged left lingular.    HEPATOBILIARY: No intrahepatic or extrahepatic biliary ductal dilation.    SPLEEN: Unremarkable.    PANCREAS: Unremarkable.    ADRENAL GLANDS: Unremarkable.    KIDNEYS: Symmetric renal enhancement. No hydronephrosis..    ABDOMINOPELVIC NODES: Prominent central mesenteric subcentimeter lymph   nodes, nonspecific.    PELVIC ORGANS: Unremarkable.    PERITONEUM/MESENTERY/BOWEL: Scattered colonic diverticulosis without   diverticulitis. No intraperitoneal free air, ascites, or bowel   obstruction. Normal appendix.    BONES/SOFT TISSUES: Umbilical fluid collection measuring 2.6 x 4.1 x 2.8   cm (TV x AP x CC; series 302/212, 602/82) likely reflecting a small   abscess. There is surrounding fat stranding in the subcutaneous tissue   and skin thickening in the periumbilical region/lower abdominal wall,   compatible with cellulitis. No acute osseous abnormalities.    VASCULAR: The abdominal aorta is of normal caliber..      IMPRESSION:    Umbilical fluid collection measuring 2.6 x 4.1 x 2.8 cm (TV x AP x CC;   series 302/212, 602/82) likely reflecting a small abscess.    Periumbilical/lower abdominal wall skin thickening with associated   subcutaneous fat stranding likely reflect cellulitis. Correlate at   bedside.    Small pulmonary nodules noted in the partially imaged left lingula likely   reflecting small airways postinfectious/inflammatory changes. Follow-up   in 6 to 12 months can be obtained for resolution.    Nonspecific prominent subcentimeter central mesenteric lymph nodes of   doubtful clinical significance. Follow-up in 12 months is suggested.    --- End of Report ---          ELICIA ROJAS; Resident Radiologist  This document has been electronically signed.  RANCHO MARIE MD; Attending Radiologist  This document has been electronically signed. Nov 19 2023 12:17AM              Home Medications:   * Patient Currently Takes Medications as of 09-May-2019 18:08 documented in Structured Notes  · 	doxycycline hyclate 100 mg oral capsule: 1 cap(s) orally 2 times a day     CURRENT ORDERS/:   · 	ceFAZolin   IVPB, [Ordered as ANCEF   IVPB]  	2000 milliGRAM(s) in IV Solution 50 milliLiter(s), IV Intermittent, once, infuse over 30 Minute(s), Stop After 1 Doses  	Indication: abscess  	Administration Instructions: This is a Look-alike/Sound-alike Medication., 18-Nov-2023, Active, 18-Nov-2023, Standard  · 	vancomycin  IVPB., 1000 milliGRAM(s) in dextrose 5% 250 milliLiter(s), IV Intermittent, once, infuse over 60 Minute(s), Stop After 1 Doses  	Indication: abscess, 18-Nov-2023, Active, 18-Nov-2023, Standard  · 	IV Insert,   Time/Priority:  STAT, 18-Nov-2023, Active, Standard             · Chief Complaint: The patient is a 28y Male c/o abdominal wound/ drainage  29 y/o male,  no pmh, no prior  surgery,   presents for eval of abd wall redness, pain . sx noticed 2 days ago. pt went to urgent care  yesterday and was started on keflex. pt s/p 1 day of keflex but redness increased and pt developed purulent discharge today. no fevers. no trauma. no known bug bite.  no cp no sob           PAST MEDICAL/SURGICAL/FAMILY/SOCIAL HISTORY:    Past Medical, Past Surgical, and Family History:  HIV:    HIV Test Questions:  · In accordance with [place] State law, we offer every patient who comes to our ED an HIV test. Would you like to be tested today?	Opt out  PAST MEDICAL HISTORY:  No pertinent past medical history.     PAST SURGICAL HISTORY:  No significant past surgical history.    ALLERGIES AND HOME MEDICATIONS:   Allergies:        Allergies:  	No Known Allergies:     Home Medications:   * Patient Currently Takes Medications as of 09-May-2019 18:08 documented in Structured Notes  · 	doxycycline hyclate 100 mg oral capsule: 1 cap(s) orally 2 times a day     CURRENT ORDERS/:   · 	ceFAZolin   IVPB, [Ordered as ANCEF   IVPB]  	2000 milliGRAM(s) in IV Solution 50 milliLiter(s), IV Intermittent, once, infuse over 30 Minute(s), Stop After 1 Doses  	Indication: abscess  	Administration Instructions: This is a Look-alike/Sound-alike Medication., 18-Nov-2023, Active, 18-Nov-2023, Standard  · 	vancomycin  IVPB., 1000 milliGRAM(s) in dextrose 5% 250 milliLiter(s), IV Intermittent, once, infuse over 60 Minute(s), Stop After 1 Doses  	Indication: abscess, 18-Nov-2023, Active, 18-Nov-2023, Standard   	IV Insert,   Time/Priority:  STAT, 18-Nov-2023, Active, Standard    Vital Signs Last 24 Hrs  T(C): 37.3 (18 Nov 2023 21:46), Max: 37.3 (18 Nov 2023 21:46)  T(F): 99.2 (18 Nov 2023 21:46), Max: 99.2 (18 Nov 2023 21:46)  HR: 114 (18 Nov 2023 21:46) (114 - 114)  BP: 129/78 (18 Nov 2023 21:46) (129/78 - 129/78)  BP(mean): --  RR: 18 (18 Nov 2023 21:46) (18 - 18)  SpO2: 98% (18 Nov 2023 21:46) (98% - 98%)    Parameters below as of 18 Nov 2023 21:46  Patient On (Oxygen Delivery Method): room air    PE  OBESE  MALE  HEENT  NCAT  PULM  CLEAR  CVS  S1S2  ABD OBESE +TENDER, + SWELLING, + DRAINING PUS FROM UMBILICUS  NEURO AXOX3  EXT NO EDEMA                        13.9   15.80 )-----------( 291      ( 18 Nov 2023 22:25 )             40.3   11-18    138  |  100  |  12  ----------------------------<  87  3.8   |  27  |  0.9    Ca    9.5      18 Nov 2023 22:25    TPro  7.2  /  Alb  4.4  /  TBili  0.7  /  DBili  x   /  AST  20  /  ALT  18  /  AlkPhos  77  11-18  ACC: [id number] EXAM:  CT ABDOMEN AND PELVIS IC   ORDERED BY: WILLIS HADLEY     PROCEDURE DATE:  11/18/2023          INTERPRETATION:  CLINICAL HISTORY / REASON FOR EXAM: Abdominal   wall/periumbilical cellulitis with drainage, evaluation for abscess    TECHNIQUE: Contiguous axial CT images were obtained from the lower chest   to the pubic symphysis following the administration of 100 mL Omnipaque   350 intravenous contrast . Oral contrast was not administered. Coronal   and sagittal reformats were submitted for review.    COMPARISON CT: None.    FINDINGS:    LOWER CHEST: Small nodules noted in the partially imaged left lingular.    HEPATOBILIARY: No intrahepatic or extrahepatic biliary ductal dilation.    SPLEEN: Unremarkable.    PANCREAS: Unremarkable.    ADRENAL GLANDS: Unremarkable.    KIDNEYS: Symmetric renal enhancement. No hydronephrosis..    ABDOMINOPELVIC NODES: Prominent central mesenteric subcentimeter lymph   nodes, nonspecific.    PELVIC ORGANS: Unremarkable.    PERITONEUM/MESENTERY/BOWEL: Scattered colonic diverticulosis without   diverticulitis. No intraperitoneal free air, ascites, or bowel   obstruction. Normal appendix.    BONES/SOFT TISSUES: Umbilical fluid collection measuring 2.6 x 4.1 x 2.8   cm (TV x AP x CC; series 302/212, 602/82) likely reflecting a small   abscess. There is surrounding fat stranding in the subcutaneous tissue   and skin thickening in the periumbilical region/lower abdominal wall,   compatible with cellulitis. No acute osseous abnormalities.    VASCULAR: The abdominal aorta is of normal caliber..      IMPRESSION:    Umbilical fluid collection measuring 2.6 x 4.1 x 2.8 cm (TV x AP x CC;   series 302/212, 602/82) likely reflecting a small abscess.    Periumbilical/lower abdominal wall skin thickening with associated   subcutaneous fat stranding likely reflect cellulitis. Correlate at   bedside.    Small pulmonary nodules noted in the partially imaged left lingula likely   reflecting small airways postinfectious/inflammatory changes. Follow-up   in 6 to 12 months can be obtained for resolution.    Nonspecific prominent subcentimeter central mesenteric lymph nodes of   doubtful clinical significance. Follow-up in 12 months is suggested.    --- End of Report ---          ELICIA ROJAS; Resident Radiologist  This document has been electronically signed.  RANCHO MARIE MD; Attending Radiologist  This document has been electronically signed. Nov 19 2023 12:17AM              Home Medications:   * Patient Currently Takes Medications as of 09-May-2019 18:08 documented in Structured Notes  · 	doxycycline hyclate 100 mg oral capsule: 1 cap(s) orally 2 times a day     CURRENT ORDERS/:   · 	ceFAZolin   IVPB, [Ordered as ANCEF   IVPB]  	2000 milliGRAM(s) in IV Solution 50 milliLiter(s), IV Intermittent, once, infuse over 30 Minute(s), Stop After 1 Doses  	Indication: abscess  	Administration Instructions: This is a Look-alike/Sound-alike Medication., 18-Nov-2023, Active, 18-Nov-2023, Standard  · 	vancomycin  IVPB., 1000 milliGRAM(s) in dextrose 5% 250 milliLiter(s), IV Intermittent, once, infuse over 60 Minute(s), Stop After 1 Doses  	Indication: abscess, 18-Nov-2023, Active, 18-Nov-2023, Standard  · 	IV Insert,   Time/Priority:  STAT, 18-Nov-2023, Active, Standard             · Chief Complaint: The patient is a 28y Male c/o abdominal wound/ drainage  29 y/o male,  no pmh, no prior  surgery,   presents for eval of abd wall redness, pain . sx noticed 2 days ago. pt went to urgent care  yesterday and was started on keflex. pt s/p 1 day of keflex but redness increased and pt developed purulent discharge today. no fevers. no trauma. no known bug bite.  no cp no sob   PATIENT  SEEN AND  EXAMINED  IN ED BY  DR DAY  PATIENT GIVEN THE OPTION TO HAVE I&D IN OR BUT CHOSE  BEDSIDE  I&D  s/p  I&D/ WASHOUT with packing in  ed   by  DR DAY  TO START CLINDAMYCIN PO  TO F/U FOR PACKING  CHANGES        PAST MEDICAL/SURGICAL/FAMILY/SOCIAL HISTORY:    Past Medical, Past Surgical, and Family History:  HIV:    HIV Test Questions:  · In accordance with [place] State law, we offer every patient who comes to our ED an HIV test. Would you like to be tested today?	Opt out  PAST MEDICAL HISTORY:  No pertinent past medical history.     PAST SURGICAL HISTORY:  No significant past surgical history.    ALLERGIES AND HOME MEDICATIONS:   Allergies:        Allergies:  	No Known Allergies:     Home Medications:   * Patient Currently Takes Medications as of 09-May-2019 18:08 documented in Structured Notes  · 	doxycycline hyclate 100 mg oral capsule: 1 cap(s) orally 2 times a day     CURRENT ORDERS/:   · 	ceFAZolin   IVPB, [Ordered as ANCEF   IVPB]  	2000 milliGRAM(s) in IV Solution 50 milliLiter(s), IV Intermittent, once, infuse over 30 Minute(s), Stop After 1 Doses  	Indication: abscess  	Administration Instructions: This is a Look-alike/Sound-alike Medication., 18-Nov-2023, Active, 18-Nov-2023, Standard  · 	vancomycin  IVPB., 1000 milliGRAM(s) in dextrose 5% 250 milliLiter(s), IV Intermittent, once, infuse over 60 Minute(s), Stop After 1 Doses  	Indication: abscess, 18-Nov-2023, Active, 18-Nov-2023, Standard   	IV Insert,   Time/Priority:  STAT, 18-Nov-2023, Active, Standard    Vital Signs Last 24 Hrs  T(C): 37.3 (18 Nov 2023 21:46), Max: 37.3 (18 Nov 2023 21:46)  T(F): 99.2 (18 Nov 2023 21:46), Max: 99.2 (18 Nov 2023 21:46)  HR: 114 (18 Nov 2023 21:46) (114 - 114)  BP: 129/78 (18 Nov 2023 21:46) (129/78 - 129/78)  BP(mean): --  RR: 18 (18 Nov 2023 21:46) (18 - 18)  SpO2: 98% (18 Nov 2023 21:46) (98% - 98%)    Parameters below as of 18 Nov 2023 21:46  Patient On (Oxygen Delivery Method): room air    PE  OBESE  MALE  HEENT  NCAT  PULM  CLEAR  CVS  S1S2  ABD OBESE +TENDER, + SWELLING, + DRAINING PUS FROM UMBILICUS  NEURO AXOX3  EXT NO EDEMA                        13.9   15.80 )-----------( 291      ( 18 Nov 2023 22:25 )             40.3   11-18    138  |  100  |  12  ----------------------------<  87  3.8   |  27  |  0.9    Ca    9.5      18 Nov 2023 22:25    TPro  7.2  /  Alb  4.4  /  TBili  0.7  /  DBili  x   /  AST  20  /  ALT  18  /  AlkPhos  77  11-18  ACC: [id number] EXAM:  CT ABDOMEN AND PELVIS IC   ORDERED BY: WILLIS HADLEY     PROCEDURE DATE:  11/18/2023          INTERPRETATION:  CLINICAL HISTORY / REASON FOR EXAM: Abdominal   wall/periumbilical cellulitis with drainage, evaluation for abscess    TECHNIQUE: Contiguous axial CT images were obtained from the lower chest   to the pubic symphysis following the administration of 100 mL Omnipaque   350 intravenous contrast . Oral contrast was not administered. Coronal   and sagittal reformats were submitted for review.    COMPARISON CT: None.    FINDINGS:    LOWER CHEST: Small nodules noted in the partially imaged left lingular.    HEPATOBILIARY: No intrahepatic or extrahepatic biliary ductal dilation.    SPLEEN: Unremarkable.    PANCREAS: Unremarkable.    ADRENAL GLANDS: Unremarkable.    KIDNEYS: Symmetric renal enhancement. No hydronephrosis..    ABDOMINOPELVIC NODES: Prominent central mesenteric subcentimeter lymph   nodes, nonspecific.    PELVIC ORGANS: Unremarkable.    PERITONEUM/MESENTERY/BOWEL: Scattered colonic diverticulosis without   diverticulitis. No intraperitoneal free air, ascites, or bowel   obstruction. Normal appendix.    BONES/SOFT TISSUES: Umbilical fluid collection measuring 2.6 x 4.1 x 2.8   cm (TV x AP x CC; series 302/212, 602/82) likely reflecting a small   abscess. There is surrounding fat stranding in the subcutaneous tissue   and skin thickening in the periumbilical region/lower abdominal wall,   compatible with cellulitis. No acute osseous abnormalities.    VASCULAR: The abdominal aorta is of normal caliber..      IMPRESSION:    Umbilical fluid collection measuring 2.6 x 4.1 x 2.8 cm (TV x AP x CC;   series 302/212, 602/82) likely reflecting a small abscess.    Periumbilical/lower abdominal wall skin thickening with associated   subcutaneous fat stranding likely reflect cellulitis. Correlate at   bedside.    Small pulmonary nodules noted in the partially imaged left lingula likely   reflecting small airways postinfectious/inflammatory changes. Follow-up   in 6 to 12 months can be obtained for resolution.    Nonspecific prominent subcentimeter central mesenteric lymph nodes of   doubtful clinical significance. Follow-up in 12 months is suggested.    --- End of Report ---          ELICIA ROJAS; Resident Radiologist  This document has been electronically signed.  RANCHO MARIE MD; Attending Radiologist  This document has been electronically signed. Nov 19 2023 12:17AM              Home Medications:   * Patient Currently Takes Medications as of 09-May-2019 18:08 documented in Structured Notes  · 	doxycycline hyclate 100 mg oral capsule: 1 cap(s) orally 2 times a day     CURRENT ORDERS/:   · 	ceFAZolin   IVPB, [Ordered as ANCEF   IVPB]  	2000 milliGRAM(s) in IV Solution 50 milliLiter(s), IV Intermittent, once, infuse over 30 Minute(s), Stop After 1 Doses  	Indication: abscess  	Administration Instructions: This is a Look-alike/Sound-alike Medication., 18-Nov-2023, Active, 18-Nov-2023, Standard  · 	vancomycin  IVPB., 1000 milliGRAM(s) in dextrose 5% 250 milliLiter(s), IV Intermittent, once, infuse over 60 Minute(s), Stop After 1 Doses  	Indication: abscess, 18-Nov-2023, Active, 18-Nov-2023, Standard  · 	IV Insert,   Time/Priority:  STAT, 18-Nov-2023, Active, Standard

## 2023-11-19 NOTE — ED PROVIDER NOTE - CLINICAL SUMMARY MEDICAL DECISION MAKING FREE TEXT BOX
pt doing well, cellulitis improving, no large amount of purulent discharge  packing changed by surg RUPA moore  rec cont oral abx, close surg clinic f/u for packing change/wound check  return precautions given

## 2023-11-19 NOTE — ED PROVIDER NOTE - PROGRESS NOTE DETAILS
surg consulted, radha to drain in ED AY: patient s/p abscess drainage, per surgery resident's recommendations patient was told he should return to emergency department within 24 hours for wound check/packing replacement and was told to take 450 mg clindamycin every 8 hours for the next 7 days.  Also advised to follow-up with Dr. Lakhani within the next week.

## 2023-11-19 NOTE — ED PROVIDER NOTE - ATTENDING CONTRIBUTION TO CARE
27 yo m no pmh  pt presents for wound check/packing change. pt was seen for abd cellulitis and CT showing abscess. I&D by surg and pt was dc w/ plan for next day wound check/packing change. pt doing well, some pain w/ movements but cellulitis improving. no fevers. pt eating well    vss  gen- NAD, aaox3  card-rrr  lungs-no resp distress   abd-sntnd, no guarding or rebound, bloody discharge from packing site, no purulence   neuro- full str/sensation, cn ii-xii grossly intact, normal coordination

## 2023-11-19 NOTE — ED PROVIDER NOTE - CLINICAL SUMMARY MEDICAL DECISION MAKING FREE TEXT BOX
Throughout ED observation period, pt remained clinically stable.  VS improving w/ hydration and abx, ct showing abscess  surg consulted, pt offered bedside I&D vs OR I&D  pt pref bedside, which was performed by surg and packed  pt advised to come back in ~24 hrs for packing changes  return precautions for fevers, increasing redness, discussed

## 2023-11-19 NOTE — ED PROVIDER NOTE - CARE PROVIDER_API CALL
Pablo Lakhani  Surgery  [address] [place] [address], [address]  [place] [address]  Phone: [phone]  Fax: [phone]  Scheduled Appointment: 11/21/2023

## 2023-11-19 NOTE — ED PROVIDER NOTE - PHYSICAL EXAMINATION
Vital Signs: I have reviewed the initial vital signs.  Constitutional: appears stated age, no acute distress  Eyes:Sclera clear, EOMI.  Cardiovascular: S1 and S2, regular rate, regular rhythm, well-perfused extremities, radial pulses equal and 2+, pedal pulses 2+ and equal  Respiratory: unlabored respiratory effort, clear to auscultation bilaterally no wheezing, rales, or rhonchi  Gastrointestinal:  abdomen soft, mild periumbilical tenderness to palpation  Musculoskeletal: supple neck, no lower extremity edema  Integumentary: warm, dry, periumbilical erythema with wound packing in umbilicus  Neurologic: awake, alert, oriented x3, extremities’ motor and sensory functions grossly intact

## 2023-11-19 NOTE — CONSULT NOTE ADULT - ASSESSMENT
UMBILICAL ABSCESS  Umbilical fluid collection measuring 2.6 x 4.1 x 2.8 cm (TV x AP x CC;   series 302/212, 602/82) likely reflecting a small abscess.  NPO  IVF  IV ABX ANCEF/ VANCO  PAIN MGMT  WILL FOLLOW UMBILICAL ABSCESS  Umbilical fluid collection measuring 2.6 x 4.1 x 2.8 cm (TV x AP x CC;   series 302/212, 602/82) likely reflecting a small abscess.  NPO  IVF  IV ABX ANCEF/ VANCO  PAIN MGMT  WILL FOLLOW    PATIENT  SEEN AND  EXAMINED  IN ED BY  DR DAY  PATIENT GIVEN THE OPTION TO HAVE I&D IN OR BUT CHOSE  BEDSIDE  I&D  s/p  I&D/ WASHOUT with packing in  ed   by  DR DAY  TO START CLINDAMYCIN PO  TO F/U FOR PACKING  CHANGES

## 2023-11-19 NOTE — ED PROVIDER NOTE - OBJECTIVE STATEMENT
28-year-old male denies significant past medical history presents complaint of periumbilical rash.  Reports since Thursday he has had periumbilical erythema and for the past few hours has had purulent discharge from his umbilicus.  Reports he was at urgent care within the past 24 hours where he received a prescription for Keflex and mupirocin but has not seen significant relief, prompting ED visit.  Denies fever/chills, chest pain, shortness of breath, cough, abdominal pain, nausea/vomiting/diarrhea, change in bowel/bladder habits, melena/hematochezia, numbness/tingling, lightheadedness, weakness.

## 2023-11-22 ENCOUNTER — APPOINTMENT (OUTPATIENT)
Dept: SURGERY | Facility: CLINIC | Age: 28
End: 2023-11-22
Payer: COMMERCIAL

## 2023-11-22 ENCOUNTER — APPOINTMENT (OUTPATIENT)
Dept: SURGERY | Facility: CLINIC | Age: 28
End: 2023-11-22

## 2023-11-22 VITALS
SYSTOLIC BLOOD PRESSURE: 112 MMHG | HEART RATE: 94 BPM | DIASTOLIC BLOOD PRESSURE: 78 MMHG | TEMPERATURE: 97.3 F | HEIGHT: 69 IN | OXYGEN SATURATION: 99 %

## 2023-11-22 DIAGNOSIS — L02.216 CUTANEOUS ABSCESS OF UMBILICUS: ICD-10-CM

## 2023-11-22 PROCEDURE — 99213 OFFICE O/P EST LOW 20 MIN: CPT

## 2023-11-26 PROBLEM — L02.216 ABSCESS, UMBILICAL: Status: ACTIVE | Noted: 2023-11-26

## 2023-11-26 RX ORDER — CLINDAMYCIN HYDROCHLORIDE 300 MG/1
CAPSULE ORAL
Refills: 0 | Status: ACTIVE | COMMUNITY

## 2023-11-28 ENCOUNTER — APPOINTMENT (OUTPATIENT)
Dept: SURGERY | Facility: CLINIC | Age: 28
End: 2023-11-28
Payer: COMMERCIAL

## 2023-11-28 VITALS
TEMPERATURE: 97 F | OXYGEN SATURATION: 98 % | WEIGHT: 300 LBS | HEIGHT: 69 IN | DIASTOLIC BLOOD PRESSURE: 74 MMHG | SYSTOLIC BLOOD PRESSURE: 122 MMHG | BODY MASS INDEX: 44.43 KG/M2 | HEART RATE: 78 BPM

## 2023-11-28 PROCEDURE — 99215 OFFICE O/P EST HI 40 MIN: CPT | Mod: 24

## 2023-12-01 ENCOUNTER — NON-APPOINTMENT (OUTPATIENT)
Age: 28
End: 2023-12-01

## 2023-12-01 ENCOUNTER — OUTPATIENT (OUTPATIENT)
Dept: OUTPATIENT SERVICES | Facility: HOSPITAL | Age: 28
LOS: 1 days | End: 2023-12-01
Payer: COMMERCIAL

## 2023-12-01 VITALS
OXYGEN SATURATION: 98 % | RESPIRATION RATE: 16 BRPM | SYSTOLIC BLOOD PRESSURE: 112 MMHG | TEMPERATURE: 98 F | HEART RATE: 88 BPM | WEIGHT: 302.03 LBS | HEIGHT: 68 IN | DIASTOLIC BLOOD PRESSURE: 76 MMHG

## 2023-12-01 DIAGNOSIS — R19.05 PERIUMBILIC SWELLING, MASS OR LUMP: ICD-10-CM

## 2023-12-01 DIAGNOSIS — Z01.818 ENCOUNTER FOR OTHER PREPROCEDURAL EXAMINATION: ICD-10-CM

## 2023-12-01 DIAGNOSIS — Z98.890 OTHER SPECIFIED POSTPROCEDURAL STATES: Chronic | ICD-10-CM

## 2023-12-01 LAB
APPEARANCE UR: CLEAR — SIGNIFICANT CHANGE UP
APPEARANCE UR: CLEAR — SIGNIFICANT CHANGE UP
BILIRUB UR-MCNC: NEGATIVE — SIGNIFICANT CHANGE UP
BILIRUB UR-MCNC: NEGATIVE — SIGNIFICANT CHANGE UP
COLOR SPEC: YELLOW — SIGNIFICANT CHANGE UP
COLOR SPEC: YELLOW — SIGNIFICANT CHANGE UP
DIFF PNL FLD: NEGATIVE — SIGNIFICANT CHANGE UP
DIFF PNL FLD: NEGATIVE — SIGNIFICANT CHANGE UP
GLUCOSE UR QL: NEGATIVE MG/DL — SIGNIFICANT CHANGE UP
GLUCOSE UR QL: NEGATIVE MG/DL — SIGNIFICANT CHANGE UP
KETONES UR-MCNC: ABNORMAL MG/DL
KETONES UR-MCNC: ABNORMAL MG/DL
LEUKOCYTE ESTERASE UR-ACNC: NEGATIVE — SIGNIFICANT CHANGE UP
LEUKOCYTE ESTERASE UR-ACNC: NEGATIVE — SIGNIFICANT CHANGE UP
NITRITE UR-MCNC: NEGATIVE — SIGNIFICANT CHANGE UP
NITRITE UR-MCNC: NEGATIVE — SIGNIFICANT CHANGE UP
PH UR: 6 — SIGNIFICANT CHANGE UP (ref 5–8)
PH UR: 6 — SIGNIFICANT CHANGE UP (ref 5–8)
PROT UR-MCNC: NEGATIVE MG/DL — SIGNIFICANT CHANGE UP
PROT UR-MCNC: NEGATIVE MG/DL — SIGNIFICANT CHANGE UP
SP GR SPEC: 1.01 — SIGNIFICANT CHANGE UP (ref 1–1.03)
SP GR SPEC: 1.01 — SIGNIFICANT CHANGE UP (ref 1–1.03)
UROBILINOGEN FLD QL: 0.2 MG/DL — SIGNIFICANT CHANGE UP (ref 0.2–1)
UROBILINOGEN FLD QL: 0.2 MG/DL — SIGNIFICANT CHANGE UP (ref 0.2–1)

## 2023-12-01 PROCEDURE — 81003 URINALYSIS AUTO W/O SCOPE: CPT

## 2023-12-01 PROCEDURE — 99214 OFFICE O/P EST MOD 30 MIN: CPT | Mod: 25

## 2023-12-01 NOTE — H&P PST ADULT - REASON FOR ADMISSION
29 yo male presents for PAST in preparation for excision of umbilical soft tissue mass and repair of ventral hernia with mesh on 12/11/2023 under general anesthesia by Dr. Kapoor ([facility]). 27 yo male presents for PAST in preparation for excision of umbilical soft tissue mass and repair of ventral hernia with mesh on 12/11/2023 under general anesthesia by Dr. Kapoor ([facility]). 27 yo male presents for PAST in preparation for excision of umbilical soft tissue mass and repair of ventral hernia with mesh on 12/11/2023 under general anesthesia by Dr. Kapoor ([facility]).

## 2023-12-01 NOTE — H&P PST ADULT - NSANTHOSAYNRD_GEN_A_CORE
No. LISETTE screening performed.  STOP BANG Legend: 0-2 = LOW Risk; 3-4 = INTERMEDIATE Risk; 5-8 = HIGH Risk

## 2023-12-01 NOTE — H&P PST ADULT - HISTORY OF PRESENT ILLNESS
Pt states in 11/2023 he presented to the ER with an abscess of the abdomen. While admitted, he went for imaging which also revealed a ventral hernia. He is unaware of how long he has had it but wants to fix it now that abscess has resolved.    Denies any chest pain, difficulty breathing, SOB, palpitations, dysuria, URI, or any other infections in the last 2 weeks. Denies any recent travel, contact, or exposure to any persons with known or suspected COVID-19. Denies any suicidal or homicidal ideations. LISETTE reviewed with patient.     Endorses this is their full medical & surgical history including medications prescribed. Pt verbalized understanding of all pre-operative instructions and was given the opportunity to ask questions and have them answered. They were instructed to follow up with their surgeon/proceduralists office with any additional questions regarding their procedure.    Anesthesia Alert  YES--Difficult Airway: Class IV  NO--History of neck surgery or radiation  NO--Limited ROM of neck  NO--History of Malignant hyperthermia  NO--No personal or family history of Pseudocholinesterase deficiency.  NO--Prior Anesthesia Complication  NO--Latex Allergy  NO--Loose teeth  NO--History of Rheumatoid Arthritis  YES--Very high suspicion of LISETTE; has not been tested but was informed he may have it when he had an endoscopy.  NO--Bleeding Risk  NO--Other_____    Revised Cardiac Risk Index for Pre-Operative Risk   RESULT SUMMARY:  0 points  Class I Risk    3.9 %  30-day risk of death, MI, or cardiac arrest    From [facility]. These numbers are higher than those from the original study (Thomas 1999). See Evidence for details.    INPUTS:  Elevated-risk surgery —> 0 = No  History of ischemic heart disease —> 0 = No  History of congestive heart failure —> 0 = No  History of cerebrovascular disease —> 0 = No  Pre-operative treatment with insulin —> 0 = No  Pre-operative creatinine >2 mg/dL / 176.8 µmol/L —> 0 = No    Duke Activity Status Index (DASI)  RESULT SUMMARY:  58.2 points  The higher the score (maximum 58.2), the higher the functional status.    9.89 METs    INPUTS:  Take care of self —> 2.75 = Yes  Walk indoors —> 1.75 = Yes  Walk 1&ndash;2 blocks on level ground —> 2.75 = Yes  Climb a flight of stairs or walk up a hill —> 5.5 = Yes  Run a short distance —> 8 = Yes  Do light work around the house —> 2.7 = Yes  Do moderate work around the house —> 3.5 = Yes  Do heavy work around the house —> 8 = Yes  Do yardwork —> 4.5 = Yes  Have sexual relations —> 5.25 = Yes  Participate in moderate recreational activities —> 6 = Yes  Participate in strenuous sports —> 7.5 = Yes Pt states in 11/2023 he presented to the ER with an abscess of the abdomen. While admitted, he went for imaging which also revealed a ventral hernia. He is unaware of how long he has had it but wants to fix it now that abscess has resolved.    Denies any chest pain, difficulty breathing, SOB, palpitations, dysuria, URI, or any other infections in the last 2 weeks. Denies any recent travel, contact, or exposure to any persons with known or suspected COVID-19. Denies any suicidal or homicidal ideations. LISETTE reviewed with patient.     Endorses this is their full medical & surgical history including medications prescribed. Pt verbalized understanding of all pre-operative instructions and was given the opportunity to ask questions and have them answered. They were instructed to follow up with their surgeon/proceduralists office with any additional questions regarding their procedure.    Anesthesia Alert  YES--Difficult Airway: Class IV  NO--History of neck surgery or radiation  NO--Limited ROM of neck  NO--History of Malignant hyperthermia  NO--No personal or family history of Pseudocholinesterase deficiency.  NO--Prior Anesthesia Complication  NO--Latex Allergy  NO--Loose teeth  NO--History of Rheumatoid Arthritis  YES--Very high suspicion of LISETTE; has not been tested but was informed he may have it when he had an endoscopy.  NO--Bleeding Risk  NO--Other_____    Revised Cardiac Risk Index for Pre-Operative Risk   RESULT SUMMARY:  0 points  Class I Risk    3.9 %  30-day risk of death, MI, or cardiac arrest    [facility]. These numbers are higher than those from the original study (Thomas 1999). See Evidence for details.    INPUTS:  Elevated-risk surgery —> 0 = No  History of ischemic heart disease —> 0 = No  History of congestive heart failure —> 0 = No  History of cerebrovascular disease —> 0 = No  Pre-operative treatment with insulin —> 0 = No  Pre-operative creatinine >2 mg/dL / 176.8 µmol/L —> 0 = No    Duke Activity Status Index (DASI)  RESULT SUMMARY:  58.2 points  The higher the score (maximum 58.2), the higher the functional status.    9.89 METs    INPUTS:  Take care of self —> 2.75 = Yes  Walk indoors —> 1.75 = Yes  Walk 1&ndash;2 blocks on level ground —> 2.75 = Yes  Climb a flight of stairs or walk up a hill —> 5.5 = Yes  Run a short distance —> 8 = Yes  Do light work around the house —> 2.7 = Yes  Do moderate work around the house —> 3.5 = Yes  Do heavy work around the house —> 8 = Yes  Do yardwork —> 4.5 = Yes  Have sexual relations —> 5.25 = Yes  Participate in moderate recreational activities —> 6 = Yes  Participate in strenuous sports —> 7.5 = Yes Pt states in 11/2023 he presented to the ER with an abscess of the abdomen. While admitted, he went for imaging which also revealed a ventral hernia. He is unaware of how long he has had it but wants to fix it now that abscess has resolved.    Denies any chest pain, difficulty breathing, SOB, palpitations, dysuria, URI, or any other infections in the last 2 weeks. Denies any recent travel, contact, or exposure to any persons with known or suspected COVID-19. Denies any suicidal or homicidal ideations. LISETTE reviewed with patient.     Endorses this is their full medical & surgical history including medications prescribed. Pt verbalized understanding of all pre-operative instructions and was given the opportunity to ask questions and have them answered. They were instructed to follow up with their surgeon/proceduralists office with any additional questions regarding their procedure.    Anesthesia Alert  YES--Difficult Airway: Class IV  NO--History of neck surgery or radiation  NO--Limited ROM of neck  NO--History of Malignant hyperthermia  NO--No personal or family history of Pseudocholinesterase deficiency.  NO--Prior Anesthesia Complication  NO--Latex Allergy  NO--Loose teeth  NO--History of Rheumatoid Arthritis  YES--Very high suspicion of LISETTE; has not been tested but was informed he may have it when he had an endoscopy.  NO--Bleeding Risk  NO--Other_____    Revised Cardiac Risk Index for Pre-Operative Risk   RESULT SUMMARY:  0 points  Class I Risk    3.9 %  30-day risk of death, MI, or cardiac arrest    From [facility]. These numbers are higher than those from the original study (Thomas 1999). See Evidence for details.    INPUTS:  Elevated-risk surgery —> 0 = No  History of ischemic heart disease —> 0 = No  History of congestive heart failure —> 0 = No  History of cerebrovascular disease —> 0 = No  Pre-operative treatment with insulin —> 0 = No  Pre-operative creatinine >2 mg/dL / 176.8 µmol/L —> 0 = No    Duke Activity Status Index (DASI)  RESULT SUMMARY:  58.2 points  The higher the score (maximum 58.2), the higher the functional status.    9.89 METs    INPUTS:  Take care of self —> 2.75 = Yes  Walk indoors —> 1.75 = Yes  Walk 1&ndash;2 blocks on level ground —> 2.75 = Yes  Climb a flight of stairs or walk up a hill —> 5.5 = Yes  Run a short distance —> 8 = Yes  Do light work around the house —> 2.7 = Yes  Do moderate work around the house —> 3.5 = Yes  Do heavy work around the house —> 8 = Yes  Do yardwork —> 4.5 = Yes  Have sexual relations —> 5.25 = Yes  Participate in moderate recreational activities —> 6 = Yes  Participate in strenuous sports —> 7.5 = Yes

## 2023-12-02 DIAGNOSIS — R19.05 PERIUMBILIC SWELLING, MASS OR LUMP: ICD-10-CM

## 2023-12-02 DIAGNOSIS — Z01.818 ENCOUNTER FOR OTHER PREPROCEDURAL EXAMINATION: ICD-10-CM

## 2023-12-05 PROBLEM — Z87.19 PERSONAL HISTORY OF OTHER DISEASES OF THE DIGESTIVE SYSTEM: Chronic | Status: ACTIVE | Noted: 2023-12-01

## 2023-12-07 ENCOUNTER — APPOINTMENT (OUTPATIENT)
Dept: PULMONOLOGY | Facility: CLINIC | Age: 28
End: 2023-12-07
Payer: COMMERCIAL

## 2023-12-07 VITALS
HEIGHT: 68 IN | WEIGHT: 305 LBS | RESPIRATION RATE: 14 BRPM | SYSTOLIC BLOOD PRESSURE: 132 MMHG | BODY MASS INDEX: 46.23 KG/M2 | OXYGEN SATURATION: 95 % | HEART RATE: 82 BPM | DIASTOLIC BLOOD PRESSURE: 80 MMHG

## 2023-12-07 DIAGNOSIS — Z01.811 ENCOUNTER FOR PREPROCEDURAL RESPIRATORY EXAMINATION: ICD-10-CM

## 2023-12-07 DIAGNOSIS — G47.33 OBSTRUCTIVE SLEEP APNEA (ADULT) (PEDIATRIC): ICD-10-CM

## 2023-12-07 PROCEDURE — 99204 OFFICE O/P NEW MOD 45 MIN: CPT

## 2023-12-07 RX ORDER — AZELASTINE HYDROCHLORIDE 205.5 UG/1
0.15 SPRAY, METERED NASAL
Refills: 0 | Status: ACTIVE | COMMUNITY

## 2023-12-07 RX ORDER — FEXOFENADINE HYDROCHLORIDE 60 MG/1
TABLET, FILM COATED ORAL
Refills: 0 | Status: ACTIVE | COMMUNITY

## 2023-12-08 ENCOUNTER — NON-APPOINTMENT (OUTPATIENT)
Age: 28
End: 2023-12-08

## 2023-12-08 NOTE — ASU PATIENT PROFILE, ADULT - FALL HARM RISK - UNIVERSAL INTERVENTIONS
Subjective:      Patient ID: Aaron Moya is a 49 y.o. male.    Chief Complaint: Foot Pain and Numbness    Aaron is a 49 y.o. male who presents to the podiatry clinic  with complaint of  bilateral foot pain. Onset of the symptoms was several years ago. Precipitating event: none known. Current symptoms include:  numbness, tingling and burning in the feet, left greater than right . Aggravating factors:  none . Symptoms have gradually worsened. Patient has had prior foot problems. Evaluation to date: MRI: abnormal pinched nerve in his back and NCV/ EMG: neuropathy . Treatment to date:  oral neuropathic pain medications which have been somewhat effective . Patients rates pain 6/10 on pain scale on its worst days.  Patient also reports a mild re-examination of plantar fasciitis symptoms to the bilateral foot.    Review of Systems   Constitutional: Negative for chills and fever.   Cardiovascular:  Negative for chest pain and leg swelling.   Respiratory:  Negative for cough and shortness of breath.    Gastrointestinal:  Negative for diarrhea, nausea and vomiting.         Objective:      Physical Exam  Vitals reviewed.   Constitutional:       General: He is not in acute distress.     Appearance: Normal appearance. He is not ill-appearing.   HENT:      Head: Normocephalic.      Nose: Nose normal.   Pulmonary:      Effort: Pulmonary effort is normal. No respiratory distress.   Skin:     Capillary Refill: Capillary refill takes 2 to 3 seconds.   Neurological:      Mental Status: He is alert and oriented to person, place, and time.     Musculoskeletal:  5/5 muscle strength noted bilateral foot, pain and tenderness with palpation plantar aspect bilateral heel at the calcaneal medial tubercle/origin of the plantar fascia, no masses noted bilateral foot   Neurologic:  Protective sensation is diminished to distal aspect of the forefoot bilateral foot.  Positive paresthesias reported bilateral foot, positive burning reported  bilateral foot, light touch sensation intact bilateral lower extremity   Dermatologic:  No open lesions noted bilateral foot, no rashes noted bilateral foot  Vascular:  DP and PT pulses palpable 2/4 bilateral foot, capillary fill time less than 3 seconds to distal aspect of the digits bilateral foot, skin temperature gradient within normal limits from proximal distal bilateral foot        Assessment:       Encounter Diagnoses   Name Primary?    Idiopathic peripheral neuropathy Yes    Plantar fasciitis     Pain in both feet          Plan:       Aaron was seen today for foot pain and numbness.    Diagnoses and all orders for this visit:    Idiopathic peripheral neuropathy    Plantar fasciitis    Pain in both feet    I counseled the patient on his conditions, their implications and medical management.        1. Patient was examined and evaluated   2. Discussed with patient etiology of peripheral neuropathy and common treatment profiles.  Patient was advised to continue with his current regimen of oral gabapentin 1800 mg per day.  Patient was advised to continue to follow-up for possible treatments of lower back pain with his back specialist  3. Discussed with patient again etiology of plantar fasciitis.  Patient was advised to reinstitute use of plantar fascia night splint at night, OTC inserts within the shoe gear, stretching, and icing of the bilateral lower extremity.  Patient was advised to continue with comfortable shoe gear both inside and outside the home  4. Patient was advised discontinuing use of flip-flops and limit barefoot walking.    5. Discussed with patient possible local steroid injections if plantar fascia pain increases or if localized areas of nerve irritations are found over time.    6. Patient will follow-up in 2 months or p.r.n. for complaints             Bed in lowest position, wheels locked, appropriate side rails in place/Call bell, personal items and telephone in reach/Instruct patient to call for assistance before getting out of bed or chair/Non-slip footwear when patient is out of bed/[place] to call system/Physically safe environment - no spills, clutter or unnecessary equipment/Purposeful Proactive Rounding/Room/bathroom lighting operational, light cord in reach Bed in lowest position, wheels locked, appropriate side rails in place/Call bell, personal items and telephone in reach/Instruct patient to call for assistance before getting out of bed or chair/Non-slip footwear when patient is out of bed/[place] to call system/Physically safe environment - no spills, clutter or unnecessary equipment/Purposeful Proactive Rounding/Room/bathroom lighting operational, light cord in reach

## 2023-12-11 ENCOUNTER — TRANSCRIPTION ENCOUNTER (OUTPATIENT)
Age: 28
End: 2023-12-11

## 2023-12-11 ENCOUNTER — APPOINTMENT (OUTPATIENT)
Dept: SURGERY | Facility: HOSPITAL | Age: 28
End: 2023-12-11

## 2023-12-11 ENCOUNTER — OUTPATIENT (OUTPATIENT)
Dept: OUTPATIENT SERVICES | Facility: HOSPITAL | Age: 28
LOS: 1 days | Discharge: HOME | End: 2023-12-11
Payer: COMMERCIAL

## 2023-12-11 VITALS
DIASTOLIC BLOOD PRESSURE: 60 MMHG | RESPIRATION RATE: 20 BRPM | TEMPERATURE: 98 F | OXYGEN SATURATION: 98 % | SYSTOLIC BLOOD PRESSURE: 118 MMHG | HEART RATE: 74 BPM

## 2023-12-11 VITALS
DIASTOLIC BLOOD PRESSURE: 67 MMHG | TEMPERATURE: 98 F | HEART RATE: 90 BPM | HEIGHT: 68 IN | OXYGEN SATURATION: 100 % | SYSTOLIC BLOOD PRESSURE: 117 MMHG | WEIGHT: 300.05 LBS | RESPIRATION RATE: 17 BRPM

## 2023-12-11 DIAGNOSIS — Z98.890 OTHER SPECIFIED POSTPROCEDURAL STATES: Chronic | ICD-10-CM

## 2023-12-11 DIAGNOSIS — K43.9 VENTRAL HERNIA WITHOUT OBSTRUCTION OR GANGRENE: ICD-10-CM

## 2023-12-11 DIAGNOSIS — R19.05 PERIUMBILIC SWELLING, MASS OR LUMP: ICD-10-CM

## 2023-12-11 PROCEDURE — C9399: CPT

## 2023-12-11 PROCEDURE — 49614 RPR AA HRN RCR < 3 NCR/STRN: CPT

## 2023-12-11 PROCEDURE — C1781: CPT

## 2023-12-11 RX ORDER — TRAMADOL HYDROCHLORIDE 50 MG/1
1 TABLET ORAL
Qty: 5 | Refills: 0
Start: 2023-12-11

## 2023-12-11 RX ORDER — FEXOFENADINE HCL 30 MG
1 TABLET ORAL
Refills: 0 | DISCHARGE

## 2023-12-11 RX ORDER — FLUTICASONE PROPIONATE 50 MCG
1 SPRAY, SUSPENSION NASAL
Refills: 0 | DISCHARGE

## 2023-12-11 RX ORDER — HYDROMORPHONE HYDROCHLORIDE 2 MG/ML
1 INJECTION INTRAMUSCULAR; INTRAVENOUS; SUBCUTANEOUS
Refills: 0 | Status: DISCONTINUED | OUTPATIENT
Start: 2023-12-11 | End: 2023-12-11

## 2023-12-11 RX ORDER — HYDROMORPHONE HYDROCHLORIDE 2 MG/ML
0.5 INJECTION INTRAMUSCULAR; INTRAVENOUS; SUBCUTANEOUS
Refills: 0 | Status: DISCONTINUED | OUTPATIENT
Start: 2023-12-11 | End: 2023-12-11

## 2023-12-11 RX ORDER — AZELASTINE 137 UG/1
2 SPRAY, METERED NASAL
Refills: 0 | DISCHARGE

## 2023-12-11 RX ORDER — TRAMADOL HYDROCHLORIDE 50 MG/1
50 TABLET, COATED ORAL AS DIRECTED
Qty: 7 | Refills: 0 | Status: ACTIVE | COMMUNITY
Start: 2023-12-11 | End: 1900-01-01

## 2023-12-11 RX ORDER — ONDANSETRON 8 MG/1
4 TABLET, FILM COATED ORAL ONCE
Refills: 0 | Status: DISCONTINUED | OUTPATIENT
Start: 2023-12-11 | End: 2023-12-11

## 2023-12-11 RX ADMIN — HYDROMORPHONE HYDROCHLORIDE 0.5 MILLIGRAM(S): 2 INJECTION INTRAMUSCULAR; INTRAVENOUS; SUBCUTANEOUS at 14:05

## 2023-12-11 RX ADMIN — HYDROMORPHONE HYDROCHLORIDE 0.5 MILLIGRAM(S): 2 INJECTION INTRAMUSCULAR; INTRAVENOUS; SUBCUTANEOUS at 13:48

## 2023-12-11 NOTE — ASU DISCHARGE PLAN (ADULT/PEDIATRIC) - CARE PROVIDER_API CALL
Jan Kapoor  Complex General Surgical Oncology  [address], [facility] [address] [place] [address]  Phone: [phone]  Fax: [phone]  Follow Up Time: 2 weeks   Jan Kapoor  Complex General Surgical Oncology  [address], [facility] [address] [place] [address]  Phone: [phone]  Fax: [phone]  Follow Up Time: 2 weeks

## 2023-12-11 NOTE — ASU DISCHARGE PLAN (ADULT/PEDIATRIC) - ASU DC SPECIAL INSTRUCTIONSFT
SURGERY DISCHARGE INSTRUCTIONS    FOLLOW-UP - with Dr. Kapoor in 2 weeks. Call the office to make an appointment or if you have any questions/concerns.    DIET - regular.     ACTIVITY- No heavy lifting for 4-6 wks over 10-20 lbs. Walking is encouraged. No running or swimming. No driving while taking pain medication.    WOUNDCARE - You have purple glue over your incisions instead, this will come off with time. In your belly button there is a yellow vasoline gauze, with a white gauze on top, keep on until office visit. Keep abdominal binder on except when bathing. Pat area dry when wet, keep clean and dry. Do not apply powders or lotion to wound area.     PAIN MEDS - Take prescription pain meds as instructed but only if needed as they can be addicting. Take over the counter extra strength tylenol 500mg and/or ibprofen 400mg with food every 6 hours for pain instead of prescription pain meds if you do not need stronger pain control. No driving or operating machinery while taking prescription pain meds. SURGERY DISCHARGE INSTRUCTIONS    FOLLOW-UP - with Dr. Kapoor in 2 weeks. Call the office to make an appointment or if you have any questions/concerns.    DIET - regular.     ACTIVITY- No heavy lifting for 4-6 wks over 10-20 lbs. Walking is encouraged. No running or swimming. No driving while taking pain medication.    WOUNDCARE - You have purple glue over your incisions instead, this will come off with time. In your belly button there is a yellow Vaseline gauze, with a white gauze on top, keep on until office visit. Keep abdominal binder on except when bathing. Pat area dry when wet, keep clean and dry. Do not apply powders or lotion to wound area. If your gauze becomes wet, replace by packing into your belly button.     PAIN MEDS - Take prescription pain meds as instructed but only if needed as they can be addicting. Take over the counter extra strength tylenol 500mg and/or ibprofen 400mg with food every 6 hours for pain instead of prescription pain meds if you do not need stronger pain control. No driving or operating machinery while taking prescription pain meds.

## 2023-12-11 NOTE — ASU DISCHARGE PLAN (ADULT/PEDIATRIC) - PROVIDER TOKENS
PROVIDER:[TOKEN:[[id number]],FOLLOWUP:[2 weeks]] PROVIDER:[TOKEN:[[id number]],FOLLOWUP:[2 weeks]]

## 2023-12-11 NOTE — ASU DISCHARGE PLAN (ADULT/PEDIATRIC) - FOLLOW UP APPOINTMENTS
[facility],  Endoscopy/Ambulatory Surgery [facility],  Endoscopy/Ambulatory Surgery [facility]

## 2023-12-11 NOTE — CHART NOTE - NSCHARTNOTEFT_GEN_A_CORE
PACU ANESTHESIA ADMISSION NOTE      Procedure: Insertion, mesh, abdominal wall, anterior      Post op diagnosis:  Umbilical hernia      __x__  Patent Airway    __x__  Full return of protective reflexes    __x__  Full recovery from anesthesia / back to baseline     Mental Status:  __x__ Awake   _____ Alert   _____ Drowsy   _____ Sedated    Nausea/Vomiting:  __x__ NO  ______Yes,   See Post - Op Orders          Pain Scale (0-10):  __0___    Treatment: ____ None    ___x_ See Post - Op/PCA Orders    Post - Operative Fluids:   ____ Oral   __x__ See Post - Op Orders    Plan: Discharge:   __x__Home       _____Floor     _____Critical Care    _____  Other:_________________    Comments:

## 2023-12-11 NOTE — ASU DISCHARGE PLAN (ADULT/PEDIATRIC) - NS MD DC FALL RISK RISK
For information on Fall & Injury Prevention, visit: [email or web address]/[email or web address]-mobility OR  [email or web address]://[email or web address]/[email or web address]-tips-[email or web address]-injury OR  https://www.cdc.gov/steadi/patient.html For information on Fall & Injury Prevention, visit: [email or web address]://www.[email or web address]/[email or web address]-mobility OR  https://www.[facility]/[email or web address]-prevention-[email or web address]-[email or web address]-injury OR  https://www.cdc.gov/steadi/patient.html

## 2023-12-15 DIAGNOSIS — K42.0 UMBILICAL HERNIA WITH OBSTRUCTION, WITHOUT GANGRENE: ICD-10-CM

## 2023-12-15 DIAGNOSIS — K42.9 UMBILICAL HERNIA WITHOUT OBSTRUCTION OR GANGRENE: ICD-10-CM

## 2023-12-15 DIAGNOSIS — E66.01 MORBID (SEVERE) OBESITY DUE TO EXCESS CALORIES: ICD-10-CM

## 2023-12-21 ENCOUNTER — APPOINTMENT (OUTPATIENT)
Dept: SURGERY | Facility: CLINIC | Age: 28
End: 2023-12-21
Payer: COMMERCIAL

## 2023-12-21 VITALS
SYSTOLIC BLOOD PRESSURE: 130 MMHG | TEMPERATURE: 97.5 F | OXYGEN SATURATION: 97 % | BODY MASS INDEX: 46.23 KG/M2 | DIASTOLIC BLOOD PRESSURE: 84 MMHG | HEART RATE: 95 BPM | HEIGHT: 68 IN | WEIGHT: 305 LBS

## 2023-12-21 DIAGNOSIS — K42.9 UMBILICAL HERNIA W/OUT OBSTRUCTION OR GANGRENE: ICD-10-CM

## 2023-12-21 PROCEDURE — 99024 POSTOP FOLLOW-UP VISIT: CPT

## 2023-12-21 NOTE — HISTORY OF PRESENT ILLNESS
[de-identified] : SHARON LOVE  is a pleasant 28 year year old man  who came in 11/28/2023 for  umbilical hernia. He has Dr CHECO MONTEJO as His PCP.  he was seen at [facility] ED for infected incarcerated preperitoneal fat at site of umbilical hernia s/p incision and drainage, now no discharge and complete healing of his abscess area  12/21/23 post op visit

## 2023-12-21 NOTE — REASON FOR VISIT
[Post Op: _________] : a [unfilled] post op visit [Post-Op] : a post-op for [FreeTextEntry1] : Umbilical hernia repair with mesh [FreeTextEntry2] : hernia repair with mesh

## 2023-12-21 NOTE — PHYSICAL EXAM
[Normal] : supple, no neck mass and thyroid not enlarged [Normal Neck Lymph Nodes] : normal neck lymph nodes  [Normal Supraclavicular Lymph Nodes] : normal supraclavicular lymph nodes [Normal Groin Lymph Nodes] : normal groin lymph nodes [Normal Axillary Lymph Nodes] : normal axillary lymph nodes [Normal] : oriented to person, place and time, with appropriate affect [de-identified] : obese, non tender, non distended

## 2023-12-21 NOTE — ASSESSMENT
[FreeTextEntry1] : SHARON LOVE  is a pleasant 28 year year old man  who came in 11/28/2023 for  umbilical hernia. He has Dr CHECO MONTEJO as His PCP.  he was seen at [facility] ED for infected incarcerated preperitoneal fat at site of umbilical hernia s/p incision and drainage, now no discharge and complete healing of his abscess area  exam showed 1cm umbilical hernia  will plan for hernia repair, discussed pros and cons nd possible complications   12/21/23 Returns today post umbilical hernia repair with mesh. Offers no complaints. Abdomen soft non tender, non-distended. Surgical site healing well. No evidence of infection. Counseled regarding cessation, exercise post hernia repair.  Encouraged to call office with any questions or concerns.   the above plan of care with discussed in details to the patient and all questions were answered to patient satisfaction. patient instructed to follow up with the referring physician and patient primary care provider   A total of 45 minutes was spent on this visit, obtaining h/p,  reviewing previous notes/imaging, counseling the patient on coming surgery and f/u , ordering tests (below), and documenting the findings in the note.

## 2023-12-28 ENCOUNTER — APPOINTMENT (OUTPATIENT)
Dept: SLEEP CENTER | Facility: HOSPITAL | Age: 28
End: 2023-12-28

## 2024-02-14 ENCOUNTER — APPOINTMENT (OUTPATIENT)
Dept: PULMONOLOGY | Facility: CLINIC | Age: 29
End: 2024-02-14

## 2024-05-09 ENCOUNTER — APPOINTMENT (OUTPATIENT)
Dept: OTOLARYNGOLOGY | Facility: CLINIC | Age: 29
End: 2024-05-09

## 2024-10-01 ENCOUNTER — OUTPATIENT (OUTPATIENT)
Dept: OUTPATIENT SERVICES | Facility: HOSPITAL | Age: 29
LOS: 1 days | End: 2024-10-01
Payer: COMMERCIAL

## 2024-10-01 ENCOUNTER — APPOINTMENT (OUTPATIENT)
Dept: INFECTIOUS DISEASE | Facility: CLINIC | Age: 29
End: 2024-10-01

## 2024-10-01 VITALS
SYSTOLIC BLOOD PRESSURE: 102 MMHG | OXYGEN SATURATION: 99 % | HEART RATE: 82 BPM | BODY MASS INDEX: 47.74 KG/M2 | WEIGHT: 315 LBS | DIASTOLIC BLOOD PRESSURE: 69 MMHG | RESPIRATION RATE: 15 BRPM | TEMPERATURE: 98 F | HEIGHT: 68 IN

## 2024-10-01 DIAGNOSIS — Z00.00 ENCOUNTER FOR GENERAL ADULT MEDICAL EXAMINATION WITHOUT ABNORMAL FINDINGS: ICD-10-CM

## 2024-10-01 DIAGNOSIS — Z20.6 CONTACT WITH AND (SUSPECTED) EXPOSURE TO HUMAN IMMUNODEFICIENCY VIRUS [HIV]: ICD-10-CM

## 2024-10-01 DIAGNOSIS — Z98.890 OTHER SPECIFIED POSTPROCEDURAL STATES: Chronic | ICD-10-CM

## 2024-10-01 DIAGNOSIS — Z11.3 ENCOUNTER FOR SCREENING FOR INFECTIONS WITH A PREDOMINANTLY SEXUAL MODE OF TRANSMISSION: ICD-10-CM

## 2024-10-01 PROCEDURE — 99204 OFFICE O/P NEW MOD 45 MIN: CPT

## 2024-10-04 LAB
ALBUMIN SERPL ELPH-MCNC: 4.6 G/DL
ALP BLD-CCNC: 75 U/L
ALT SERPL-CCNC: 24 U/L
ANION GAP SERPL CALC-SCNC: 16 MMOL/L
AST SERPL-CCNC: 19 U/L
BILIRUB SERPL-MCNC: 0.6 MG/DL
BUN SERPL-MCNC: 18 MG/DL
C TRACH RRNA SPEC QL NAA+PROBE: NOT DETECTED
C TRACH RRNA SPEC QL NAA+PROBE: NOT DETECTED
CALCIUM SERPL-MCNC: 9.6 MG/DL
CHLORIDE SERPL-SCNC: 99 MMOL/L
CO2 SERPL-SCNC: 23 MMOL/L
CREAT SERPL-MCNC: 0.8 MG/DL
EGFR: 123 ML/MIN/1.73M2
GLUCOSE SERPL-MCNC: 79 MG/DL
HBV CORE IGG+IGM SER QL: NONREACTIVE
HBV SURFACE AB SER QL: NONREACTIVE
HBV SURFACE AG SER QL: NONREACTIVE
HCT VFR BLD CALC: 43.2 %
HCV AB SER QL: NONREACTIVE
HCV S/CO RATIO: 0.06 COI
HGB BLD-MCNC: 14.5 G/DL
HIV1 RNA # SERPL NAA+PROBE: NOT DETECTED COPIES/ML
HIV1+2 AB SPEC QL IA.RAPID: NONREACTIVE
HSV 1+2 IGG SER IA-IMP: NEGATIVE
HSV 1+2 IGG SER IA-IMP: NEGATIVE
HSV1 IGG SER QL: 0.51 INDEX
HSV2 IGG SER QL: 0.39 INDEX
MCHC RBC-ENTMCNC: 30.4 PG
MCHC RBC-ENTMCNC: 33.6 G/DL
MCV RBC AUTO: 90.6 FL
N GONORRHOEA RRNA SPEC QL NAA+PROBE: NOT DETECTED
N GONORRHOEA RRNA SPEC QL NAA+PROBE: NOT DETECTED
PLATELET # BLD AUTO: 311 K/UL
PMV BLD AUTO: 0 /100 WBCS
PMV BLD: 10.6 FL
POTASSIUM SERPL-SCNC: 4 MMOL/L
PROT SERPL-MCNC: 7.5 G/DL
RBC # BLD: 4.77 M/UL
RBC # FLD: 12.5 %
SODIUM SERPL-SCNC: 138 MMOL/L
SOURCE AMPLIFICATION: NORMAL
SOURCE AMPLIFICATION: NORMAL
SOURCE ORAL: NORMAL
T PALLIDUM AB SER QL IA: NEGATIVE
T VAGINALIS RRNA SPEC QL NAA+PROBE: NOT DETECTED
VIRAL LOAD INTERP: NOT DETECTED
VIRAL LOAD LOG: NOT DETECTED LOGCOPIES/ML
WBC # FLD AUTO: 14.21 K/UL
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
## 2024-10-06 NOTE — ASSESSMENT
[FreeTextEntry1] : 29 years old male with seasonal allergy, no reported medical issue, referred by Dr. Nahum Newberry for PEP.  # PEP - Tolerating Truvada and Isentress, has 1 more week left - Repeat labs works - Safe sex practice discussed.  follow up in 2 months [Treatment Adherence] : treatment adherence [Drug Interactions / Side Effects] : drug interactions/side effects [Sexuality / Safer Sex] : sexuality/safer sex
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
## 2024-10-06 NOTE — PHYSICAL EXAM
[General Appearance - Alert] : alert [General Appearance - In No Acute Distress] : in no acute distress [Sclera] : the sclera and conjunctiva were normal [PERRL With Normal Accommodation] : pupils were equal in size, round, reactive to light [Extraocular Movements] : extraocular movements were intact [Outer Ear] : the ears and nose were normal in appearance [Oropharynx] : the oropharynx was normal with no thrush [Neck Appearance] : the appearance of the neck was normal [Neck Cervical Mass (___cm)] : no neck mass was observed [Thyroid Diffuse Enlargement] : the thyroid was not enlarged [Jugular Venous Distention Increased] : there was no jugular-venous distention [Auscultation Breath Sounds / Voice Sounds] : lungs were clear to auscultation bilaterally [Heart Rate And Rhythm] : heart rate was normal and rhythm regular [Heart Sounds] : normal S1 and S2 [Heart Sounds Gallop] : no gallops [Murmurs] : no murmurs [Heart Sounds Pericardial Friction Rub] : no pericardial rub [Edema] : there was no peripheral edema [Abdomen Soft] : soft [Bowel Sounds] : normal bowel sounds [Abdomen Tenderness] : non-tender [Abdomen Mass (___ Cm)] : no abdominal mass palpated [Costovertebral Angle Tenderness] : no CVA tenderness [Musculoskeletal - Swelling] : no joint swelling [Nail Clubbing] : no clubbing  or cyanosis of the fingernails [Motor Tone] : muscle strength and tone were normal [Skin Color & Pigmentation] : normal skin color and pigmentation [] : no rash [Deep Tendon Reflexes (DTR)] : deep tendon reflexes were 2+ and symmetric [Sensation] : the sensory exam was normal to light touch and pinprick [No Focal Deficits] : no focal deficits [Oriented To Time, Place, And Person] : oriented to person, place, and time [Affect] : the affect was normal

## 2024-10-08 ENCOUNTER — OUTPATIENT (OUTPATIENT)
Dept: OUTPATIENT SERVICES | Facility: HOSPITAL | Age: 29
LOS: 1 days | Discharge: HOME | End: 2024-10-08
Payer: COMMERCIAL

## 2024-10-08 ENCOUNTER — APPOINTMENT (OUTPATIENT)
Dept: INFECTIOUS DISEASE | Facility: CLINIC | Age: 29
End: 2024-10-08

## 2024-10-08 DIAGNOSIS — Z11.3 ENCOUNTER FOR SCREENING FOR INFECTIONS WITH A PREDOMINANTLY SEXUAL MODE OF TRANSMISSION: ICD-10-CM

## 2024-10-08 DIAGNOSIS — Z98.890 OTHER SPECIFIED POSTPROCEDURAL STATES: Chronic | ICD-10-CM

## 2024-10-08 DIAGNOSIS — Z00.00 ENCOUNTER FOR GENERAL ADULT MEDICAL EXAMINATION WITHOUT ABNORMAL FINDINGS: ICD-10-CM

## 2024-10-08 DIAGNOSIS — Z20.6 CONTACT WITH AND (SUSPECTED) EXPOSURE TO HUMAN IMMUNODEFICIENCY VIRUS [HIV]: ICD-10-CM

## 2024-10-08 PROCEDURE — 99213 OFFICE O/P EST LOW 20 MIN: CPT

## 2024-10-15 ENCOUNTER — OUTPATIENT (OUTPATIENT)
Dept: OUTPATIENT SERVICES | Facility: HOSPITAL | Age: 29
LOS: 1 days | End: 2024-10-15

## 2024-10-15 ENCOUNTER — APPOINTMENT (OUTPATIENT)
Dept: INFECTIOUS DISEASE | Facility: CLINIC | Age: 29
End: 2024-10-15

## 2024-10-15 DIAGNOSIS — Z00.00 ENCOUNTER FOR GENERAL ADULT MEDICAL EXAMINATION WITHOUT ABNORMAL FINDINGS: ICD-10-CM

## 2024-10-15 DIAGNOSIS — Z11.3 ENCOUNTER FOR SCREENING FOR INFECTIONS WITH A PREDOMINANTLY SEXUAL MODE OF TRANSMISSION: ICD-10-CM

## 2024-10-15 DIAGNOSIS — R76.8 OTHER SPECIFIED ABNORMAL IMMUNOLOGICAL FINDINGS IN SERUM: ICD-10-CM

## 2024-10-21 PROBLEM — R76.8 HSV-2 SEROPOSITIVE: Status: ACTIVE | Noted: 2024-10-21

## 2024-10-22 ENCOUNTER — NON-APPOINTMENT (OUTPATIENT)
Age: 29
End: 2024-10-22

## 2024-10-25 ENCOUNTER — NON-APPOINTMENT (OUTPATIENT)
Age: 29
End: 2024-10-25

## 2024-11-05 ENCOUNTER — OUTPATIENT (OUTPATIENT)
Dept: OUTPATIENT SERVICES | Facility: HOSPITAL | Age: 29
LOS: 1 days | End: 2024-11-05
Payer: COMMERCIAL

## 2024-11-05 ENCOUNTER — APPOINTMENT (OUTPATIENT)
Dept: INFECTIOUS DISEASE | Facility: CLINIC | Age: 29
End: 2024-11-05

## 2024-11-05 VITALS
OXYGEN SATURATION: 97 % | WEIGHT: 315 LBS | HEART RATE: 95 BPM | DIASTOLIC BLOOD PRESSURE: 87 MMHG | RESPIRATION RATE: 15 BRPM | TEMPERATURE: 98.4 F | HEIGHT: 68 IN | SYSTOLIC BLOOD PRESSURE: 135 MMHG | BODY MASS INDEX: 47.74 KG/M2

## 2024-11-05 DIAGNOSIS — Z20.6 CONTACT WITH AND (SUSPECTED) EXPOSURE TO HUMAN IMMUNODEFICIENCY VIRUS [HIV]: ICD-10-CM

## 2024-11-05 DIAGNOSIS — Z23 ENCOUNTER FOR IMMUNIZATION: ICD-10-CM

## 2024-11-05 DIAGNOSIS — Z98.890 OTHER SPECIFIED POSTPROCEDURAL STATES: Chronic | ICD-10-CM

## 2024-11-05 DIAGNOSIS — Z11.3 ENCOUNTER FOR SCREENING FOR INFECTIONS WITH A PREDOMINANTLY SEXUAL MODE OF TRANSMISSION: ICD-10-CM

## 2024-11-05 DIAGNOSIS — R76.8 OTHER SPECIFIED ABNORMAL IMMUNOLOGICAL FINDINGS IN SERUM: ICD-10-CM

## 2024-11-05 DIAGNOSIS — Z00.00 ENCOUNTER FOR GENERAL ADULT MEDICAL EXAMINATION WITHOUT ABNORMAL FINDINGS: ICD-10-CM

## 2024-11-05 PROCEDURE — 99214 OFFICE O/P EST MOD 30 MIN: CPT

## 2024-11-05 PROCEDURE — 90471 IMMUNIZATION ADMIN: CPT | Mod: 25

## 2024-11-05 PROCEDURE — 90746 HEPB VACCINE 3 DOSE ADULT IM: CPT

## 2024-11-12 LAB
HBV SURFACE AG SER QL: NONREACTIVE
HCV AB SER QL: NONREACTIVE
HCV S/CO RATIO: 0.04 COI
HIV1 RNA # SERPL NAA+PROBE: NOT DETECTED COPIES/ML
HIV1+2 AB SPEC QL IA.RAPID: NONREACTIVE
HSV 1+2 IGG SER IA-IMP: NEGATIVE
HSV 1+2 IGG SER IA-IMP: POSITIVE
HSV1 IGG SER QL: 0.18 INDEX
HSV2 IGG SER QL: 1.29 INDEX
T PALLIDUM AB SER QL IA: NEGATIVE
VIRAL LOAD INTERP: NOT DETECTED
VIRAL LOAD LOG: NOT DETECTED LOGCOPIES/ML

## 2024-12-03 ENCOUNTER — OUTPATIENT (OUTPATIENT)
Dept: OUTPATIENT SERVICES | Facility: HOSPITAL | Age: 29
LOS: 1 days | End: 2024-12-03
Payer: COMMERCIAL

## 2024-12-03 ENCOUNTER — APPOINTMENT (OUTPATIENT)
Dept: INFECTIOUS DISEASE | Facility: CLINIC | Age: 29
End: 2024-12-03

## 2024-12-03 VITALS
RESPIRATION RATE: 14 BRPM | SYSTOLIC BLOOD PRESSURE: 114 MMHG | HEART RATE: 99 BPM | BODY MASS INDEX: 47.74 KG/M2 | WEIGHT: 315 LBS | TEMPERATURE: 98.2 F | OXYGEN SATURATION: 98 % | DIASTOLIC BLOOD PRESSURE: 69 MMHG | HEIGHT: 68 IN

## 2024-12-03 DIAGNOSIS — L30.4 ERYTHEMA INTERTRIGO: ICD-10-CM

## 2024-12-03 DIAGNOSIS — Z11.3 ENCOUNTER FOR SCREENING FOR INFECTIONS WITH A PREDOMINANTLY SEXUAL MODE OF TRANSMISSION: ICD-10-CM

## 2024-12-03 DIAGNOSIS — Z98.890 OTHER SPECIFIED POSTPROCEDURAL STATES: Chronic | ICD-10-CM

## 2024-12-03 DIAGNOSIS — R76.8 OTHER SPECIFIED ABNORMAL IMMUNOLOGICAL FINDINGS IN SERUM: ICD-10-CM

## 2024-12-03 DIAGNOSIS — Z20.6 CONTACT WITH AND (SUSPECTED) EXPOSURE TO HUMAN IMMUNODEFICIENCY VIRUS [HIV]: ICD-10-CM

## 2024-12-03 DIAGNOSIS — Z23 ENCOUNTER FOR IMMUNIZATION: ICD-10-CM

## 2024-12-03 DIAGNOSIS — Z00.00 ENCOUNTER FOR GENERAL ADULT MEDICAL EXAMINATION WITHOUT ABNORMAL FINDINGS: ICD-10-CM

## 2024-12-03 PROCEDURE — 90471 IMMUNIZATION ADMIN: CPT | Mod: 25

## 2024-12-03 PROCEDURE — 99214 OFFICE O/P EST MOD 30 MIN: CPT

## 2024-12-03 RX ORDER — NYSTATIN 100000 [USP'U]/G
100000 POWDER TOPICAL 3 TIMES DAILY
Qty: 1 | Refills: 0 | Status: ACTIVE | COMMUNITY
Start: 2024-12-03 | End: 1900-01-01

## 2024-12-03 RX ORDER — VALACYCLOVIR 500 MG/1
500 TABLET, FILM COATED ORAL DAILY
Qty: 30 | Refills: 1 | Status: ACTIVE | COMMUNITY
Start: 2024-12-03 | End: 1900-01-01

## 2024-12-06 LAB
HBV SURFACE AG SER QL: NONREACTIVE
HCV AB SER QL: NONREACTIVE
HCV S/CO RATIO: 0.05 COI
HIV1 RNA # SERPL NAA+PROBE: NOT DETECTED COPIES/ML
HIV1+2 AB SPEC QL IA.RAPID: NONREACTIVE
HSV 1+2 IGG SER IA-IMP: ABNORMAL
HSV 1+2 IGG SER IA-IMP: NEGATIVE
HSV1 IGG SER QL: 0.52 INDEX
HSV2 IGG SER QL: 1.03 INDEX
VIRAL LOAD INTERP: NOT DETECTED
VIRAL LOAD LOG: NOT DETECTED LOGCOPIES/ML

## 2024-12-09 ENCOUNTER — NON-APPOINTMENT (OUTPATIENT)
Age: 29
End: 2024-12-09

## 2024-12-09 NOTE — ED ADULT TRIAGE NOTE - WEIGHT IN LBS
FUTURE VISIT INFORMATION      FUTURE VISIT INFORMATION:  Date: 2/11/25  Time: 8:20AM  Location: [facility] [place]  REFERRAL INFORMATION:  Referring provider:  Misbah Iverson MD   Referring providers clinic:  [facility]  Reason for visit/diagnosis  neck/ear concerns, DX: Abscess of neck     RECORDS REQUESTED FROM:       Clinic name Comments Records Status Imaging Status   [facility] 11/20/24, 11/13/24 - OV Misbah Walton MD  [facility] .      [facility]  10/8/24- Ov Angelic Hardwick APRN CNP  Epic     Imaging  9/29/24- CT Facial Bone  [facility]  PACS                        
300

## 2024-12-10 ENCOUNTER — OUTPATIENT (OUTPATIENT)
Dept: OUTPATIENT SERVICES | Facility: HOSPITAL | Age: 29
LOS: 1 days | End: 2024-12-10
Payer: COMMERCIAL

## 2024-12-10 ENCOUNTER — APPOINTMENT (OUTPATIENT)
Dept: INFECTIOUS DISEASE | Facility: CLINIC | Age: 29
End: 2024-12-10

## 2024-12-10 ENCOUNTER — TRANSCRIPTION ENCOUNTER (OUTPATIENT)
Age: 29
End: 2024-12-10

## 2024-12-10 DIAGNOSIS — Z98.890 OTHER SPECIFIED POSTPROCEDURAL STATES: Chronic | ICD-10-CM

## 2024-12-10 DIAGNOSIS — Z00.00 ENCOUNTER FOR GENERAL ADULT MEDICAL EXAMINATION WITHOUT ABNORMAL FINDINGS: ICD-10-CM

## 2024-12-10 PROCEDURE — 99213 OFFICE O/P EST LOW 20 MIN: CPT

## 2024-12-19 DIAGNOSIS — R76.8 OTHER SPECIFIED ABNORMAL IMMUNOLOGICAL FINDINGS IN SERUM: ICD-10-CM

## 2025-01-10 ENCOUNTER — NON-APPOINTMENT (OUTPATIENT)
Age: 30
End: 2025-01-10

## 2025-03-04 ENCOUNTER — OUTPATIENT (OUTPATIENT)
Dept: OUTPATIENT SERVICES | Facility: HOSPITAL | Age: 30
LOS: 1 days | End: 2025-03-04
Payer: COMMERCIAL

## 2025-03-04 ENCOUNTER — APPOINTMENT (OUTPATIENT)
Dept: INFECTIOUS DISEASE | Facility: CLINIC | Age: 30
End: 2025-03-04

## 2025-03-04 VITALS
RESPIRATION RATE: 15 BRPM | WEIGHT: 315 LBS | TEMPERATURE: 98.4 F | HEIGHT: 68 IN | HEART RATE: 59 BPM | OXYGEN SATURATION: 100 % | DIASTOLIC BLOOD PRESSURE: 78 MMHG | BODY MASS INDEX: 47.74 KG/M2 | SYSTOLIC BLOOD PRESSURE: 138 MMHG

## 2025-03-04 DIAGNOSIS — Z20.6 CONTACT WITH AND (SUSPECTED) EXPOSURE TO HUMAN IMMUNODEFICIENCY VIRUS [HIV]: ICD-10-CM

## 2025-03-04 DIAGNOSIS — Z23 ENCOUNTER FOR IMMUNIZATION: ICD-10-CM

## 2025-03-04 DIAGNOSIS — R76.8 OTHER SPECIFIED ABNORMAL IMMUNOLOGICAL FINDINGS IN SERUM: ICD-10-CM

## 2025-03-04 DIAGNOSIS — Z00.00 ENCOUNTER FOR GENERAL ADULT MEDICAL EXAMINATION WITHOUT ABNORMAL FINDINGS: ICD-10-CM

## 2025-03-04 DIAGNOSIS — Z11.3 ENCOUNTER FOR SCREENING FOR INFECTIONS WITH A PREDOMINANTLY SEXUAL MODE OF TRANSMISSION: ICD-10-CM

## 2025-03-04 DIAGNOSIS — Z98.890 OTHER SPECIFIED POSTPROCEDURAL STATES: Chronic | ICD-10-CM

## 2025-03-04 PROCEDURE — 99213 OFFICE O/P EST LOW 20 MIN: CPT | Mod: 25

## 2025-03-04 PROCEDURE — 90471 IMMUNIZATION ADMIN: CPT

## 2025-03-11 LAB
ALBUMIN SERPL ELPH-MCNC: 4.6 G/DL
ALP BLD-CCNC: 83 U/L
ALT SERPL-CCNC: 47 U/L
AST SERPL-CCNC: 21 U/L
BILIRUB DIRECT SERPL-MCNC: 0.2 MG/DL
BILIRUB INDIRECT SERPL-MCNC: 0.3 MG/DL
BILIRUB SERPL-MCNC: 0.5 MG/DL
HBV SURFACE AG SER QL: NONREACTIVE
HCV AB SER QL: NONREACTIVE
HCV S/CO RATIO: 0.05 COI
HIV1+2 AB SPEC QL IA.RAPID: NONREACTIVE
HSV 1+2 IGG SER IA-IMP: NEGATIVE
HSV 1+2 IGG SER IA-IMP: POSITIVE
HSV1 IGG SER QL: 0.39 INDEX
HSV2 IGG SER QL: 2.29 INDEX
PROT SERPL-MCNC: 7.6 G/DL

## 2025-03-18 NOTE — ED ADULT TRIAGE NOTE - MEANS OF ARRIVAL
PA for mounjaro 7.5mg  NOT REQUIRED     Reason (screenshot if applicable)          Patient advised by          [] MyChart Message  [] Phone call   []LMOM  []L/M to call office as no active Communication consent on file  []Unable to leave detailed message as VM not approved on Communication consent       Pharmacy advised by    []Fax  []Phone call      
ambulatory

## 2025-04-10 ENCOUNTER — NON-APPOINTMENT (OUTPATIENT)
Age: 30
End: 2025-04-10
